# Patient Record
Sex: MALE | Race: WHITE | NOT HISPANIC OR LATINO | Employment: FULL TIME | URBAN - METROPOLITAN AREA
[De-identification: names, ages, dates, MRNs, and addresses within clinical notes are randomized per-mention and may not be internally consistent; named-entity substitution may affect disease eponyms.]

---

## 2022-04-24 ENCOUNTER — APPOINTMENT (OUTPATIENT)
Dept: RADIOLOGY | Facility: MEDICAL CENTER | Age: 67
End: 2022-04-24

## 2022-04-24 ENCOUNTER — APPOINTMENT (OUTPATIENT)
Dept: RADIOLOGY | Facility: MEDICAL CENTER | Age: 67
End: 2022-04-24
Attending: EMERGENCY MEDICINE

## 2022-04-24 ENCOUNTER — HOSPITAL ENCOUNTER (OUTPATIENT)
Facility: MEDICAL CENTER | Age: 67
End: 2022-04-24
Attending: EMERGENCY MEDICINE | Admitting: INTERNAL MEDICINE

## 2022-04-24 VITALS
SYSTOLIC BLOOD PRESSURE: 118 MMHG | OXYGEN SATURATION: 96 % | WEIGHT: 188.71 LBS | TEMPERATURE: 97.7 F | BODY MASS INDEX: 30.33 KG/M2 | HEART RATE: 61 BPM | DIASTOLIC BLOOD PRESSURE: 59 MMHG | RESPIRATION RATE: 17 BRPM | HEIGHT: 66 IN

## 2022-04-24 DIAGNOSIS — F17.200 SMOKING: ICD-10-CM

## 2022-04-24 DIAGNOSIS — R07.89 OTHER CHEST PAIN: ICD-10-CM

## 2022-04-24 DIAGNOSIS — R07.9 CHEST PAIN, UNSPECIFIED TYPE: ICD-10-CM

## 2022-04-24 LAB
ALBUMIN SERPL BCP-MCNC: 4.9 G/DL (ref 3.2–4.9)
ALBUMIN/GLOB SERPL: 1.8 G/DL
ALP SERPL-CCNC: 90 U/L (ref 30–99)
ALT SERPL-CCNC: 28 U/L (ref 2–50)
ANION GAP SERPL CALC-SCNC: 14 MMOL/L (ref 7–16)
APTT PPP: 38.6 SEC (ref 24.7–36)
AST SERPL-CCNC: 22 U/L (ref 12–45)
BASOPHILS # BLD AUTO: 0.6 % (ref 0–1.8)
BASOPHILS # BLD: 0.05 K/UL (ref 0–0.12)
BILIRUB SERPL-MCNC: 0.4 MG/DL (ref 0.1–1.5)
BUN SERPL-MCNC: 13 MG/DL (ref 8–22)
CALCIUM SERPL-MCNC: 9.9 MG/DL (ref 8.5–10.5)
CHLORIDE SERPL-SCNC: 100 MMOL/L (ref 96–112)
CO2 SERPL-SCNC: 24 MMOL/L (ref 20–33)
CREAT SERPL-MCNC: 0.73 MG/DL (ref 0.5–1.4)
D DIMER PPP IA.FEU-MCNC: 0.27 UG/ML (FEU) (ref 0–0.5)
D DIMER PPP IA.FEU-MCNC: <0.27 UG/ML (FEU) (ref 0–0.5)
EKG IMPRESSION: NORMAL
EOSINOPHIL # BLD AUTO: 0.23 K/UL (ref 0–0.51)
EOSINOPHIL NFR BLD: 2.7 % (ref 0–6.9)
ERYTHROCYTE [DISTWIDTH] IN BLOOD BY AUTOMATED COUNT: 42.5 FL (ref 35.9–50)
GFR SERPLBLD CREATININE-BSD FMLA CKD-EPI: 100 ML/MIN/1.73 M 2
GLOBULIN SER CALC-MCNC: 2.8 G/DL (ref 1.9–3.5)
GLUCOSE BLD STRIP.AUTO-MCNC: 123 MG/DL (ref 65–99)
GLUCOSE SERPL-MCNC: 112 MG/DL (ref 65–99)
HCT VFR BLD AUTO: 49.1 % (ref 42–52)
HGB BLD-MCNC: 16.4 G/DL (ref 14–18)
IMM GRANULOCYTES # BLD AUTO: 0.06 K/UL (ref 0–0.11)
IMM GRANULOCYTES NFR BLD AUTO: 0.7 % (ref 0–0.9)
INR PPP: 1.01 (ref 0.87–1.13)
LYMPHOCYTES # BLD AUTO: 1.32 K/UL (ref 1–4.8)
LYMPHOCYTES NFR BLD: 15.3 % (ref 22–41)
MCH RBC QN AUTO: 33.2 PG (ref 27–33)
MCHC RBC AUTO-ENTMCNC: 33.4 G/DL (ref 33.7–35.3)
MCV RBC AUTO: 99.4 FL (ref 81.4–97.8)
MONOCYTES # BLD AUTO: 0.74 K/UL (ref 0–0.85)
MONOCYTES NFR BLD AUTO: 8.6 % (ref 0–13.4)
NEUTROPHILS # BLD AUTO: 6.22 K/UL (ref 1.82–7.42)
NEUTROPHILS NFR BLD: 72.1 % (ref 44–72)
NRBC # BLD AUTO: 0 K/UL
NRBC BLD-RTO: 0 /100 WBC
PLATELET # BLD AUTO: 262 K/UL (ref 164–446)
PMV BLD AUTO: 9.5 FL (ref 9–12.9)
POTASSIUM SERPL-SCNC: 3.9 MMOL/L (ref 3.6–5.5)
PROT SERPL-MCNC: 7.7 G/DL (ref 6–8.2)
PROTHROMBIN TIME: 13 SEC (ref 12–14.6)
RBC # BLD AUTO: 4.94 M/UL (ref 4.7–6.1)
SODIUM SERPL-SCNC: 138 MMOL/L (ref 135–145)
TROPONIN T SERPL-MCNC: 22 NG/L (ref 6–19)
TROPONIN T SERPL-MCNC: 26 NG/L (ref 6–19)
TROPONIN T SERPL-MCNC: 28 NG/L (ref 6–19)
WBC # BLD AUTO: 8.6 K/UL (ref 4.8–10.8)

## 2022-04-24 PROCEDURE — 85025 COMPLETE CBC W/AUTO DIFF WBC: CPT

## 2022-04-24 PROCEDURE — 700102 HCHG RX REV CODE 250 W/ 637 OVERRIDE(OP): Performed by: HOSPITALIST

## 2022-04-24 PROCEDURE — 96374 THER/PROPH/DIAG INJ IV PUSH: CPT

## 2022-04-24 PROCEDURE — A9270 NON-COVERED ITEM OR SERVICE: HCPCS | Performed by: HOSPITALIST

## 2022-04-24 PROCEDURE — A9270 NON-COVERED ITEM OR SERVICE: HCPCS | Performed by: INTERNAL MEDICINE

## 2022-04-24 PROCEDURE — 93005 ELECTROCARDIOGRAM TRACING: CPT | Performed by: INTERNAL MEDICINE

## 2022-04-24 PROCEDURE — 84484 ASSAY OF TROPONIN QUANT: CPT | Mod: 91

## 2022-04-24 PROCEDURE — 85610 PROTHROMBIN TIME: CPT

## 2022-04-24 PROCEDURE — 96376 TX/PRO/DX INJ SAME DRUG ADON: CPT

## 2022-04-24 PROCEDURE — G0378 HOSPITAL OBSERVATION PER HR: HCPCS

## 2022-04-24 PROCEDURE — 93005 ELECTROCARDIOGRAM TRACING: CPT | Performed by: EMERGENCY MEDICINE

## 2022-04-24 PROCEDURE — 71045 X-RAY EXAM CHEST 1 VIEW: CPT

## 2022-04-24 PROCEDURE — 99285 EMERGENCY DEPT VISIT HI MDM: CPT

## 2022-04-24 PROCEDURE — 36415 COLL VENOUS BLD VENIPUNCTURE: CPT

## 2022-04-24 PROCEDURE — 96375 TX/PRO/DX INJ NEW DRUG ADDON: CPT

## 2022-04-24 PROCEDURE — 85379 FIBRIN DEGRADATION QUANT: CPT | Mod: 91

## 2022-04-24 PROCEDURE — 85730 THROMBOPLASTIN TIME PARTIAL: CPT

## 2022-04-24 PROCEDURE — 93005 ELECTROCARDIOGRAM TRACING: CPT

## 2022-04-24 PROCEDURE — 80053 COMPREHEN METABOLIC PANEL: CPT

## 2022-04-24 PROCEDURE — 99218 PR INITIAL OBSERVATION CARE,LEVL I: CPT | Performed by: INTERNAL MEDICINE

## 2022-04-24 PROCEDURE — 82962 GLUCOSE BLOOD TEST: CPT

## 2022-04-24 PROCEDURE — 700111 HCHG RX REV CODE 636 W/ 250 OVERRIDE (IP): Performed by: EMERGENCY MEDICINE

## 2022-04-24 PROCEDURE — 700111 HCHG RX REV CODE 636 W/ 250 OVERRIDE (IP): Performed by: INTERNAL MEDICINE

## 2022-04-24 PROCEDURE — 700102 HCHG RX REV CODE 250 W/ 637 OVERRIDE(OP): Performed by: INTERNAL MEDICINE

## 2022-04-24 RX ORDER — IBUPROFEN 600 MG/1
600 TABLET ORAL EVERY 6 HOURS PRN
Qty: 30 TABLET | Refills: 0 | Status: SHIPPED | OUTPATIENT
Start: 2022-04-24 | End: 2022-04-25

## 2022-04-24 RX ORDER — OXYCODONE HYDROCHLORIDE 5 MG/1
5 TABLET ORAL EVERY 6 HOURS PRN
Status: DISCONTINUED | OUTPATIENT
Start: 2022-04-24 | End: 2022-04-24 | Stop reason: HOSPADM

## 2022-04-24 RX ORDER — AMOXICILLIN 250 MG
2 CAPSULE ORAL 2 TIMES DAILY
Status: DISCONTINUED | OUTPATIENT
Start: 2022-04-24 | End: 2022-04-24 | Stop reason: HOSPADM

## 2022-04-24 RX ORDER — ASPIRIN 325 MG
325 TABLET ORAL DAILY
Status: DISCONTINUED | OUTPATIENT
Start: 2022-04-25 | End: 2022-04-24 | Stop reason: HOSPADM

## 2022-04-24 RX ORDER — ONDANSETRON 4 MG/1
4 TABLET, ORALLY DISINTEGRATING ORAL EVERY 4 HOURS PRN
Status: DISCONTINUED | OUTPATIENT
Start: 2022-04-24 | End: 2022-04-24 | Stop reason: HOSPADM

## 2022-04-24 RX ORDER — BISACODYL 10 MG
10 SUPPOSITORY, RECTAL RECTAL
Status: DISCONTINUED | OUTPATIENT
Start: 2022-04-24 | End: 2022-04-24 | Stop reason: HOSPADM

## 2022-04-24 RX ORDER — EMPAGLIFLOZIN, METFORMIN HYDROCHLORIDE 12.5; 1 MG/1; MG/1
1 TABLET, EXTENDED RELEASE ORAL 2 TIMES DAILY
COMMUNITY

## 2022-04-24 RX ORDER — OMEPRAZOLE 40 MG/1
40 CAPSULE, DELAYED RELEASE ORAL EVERY MORNING
COMMUNITY

## 2022-04-24 RX ORDER — ATENOLOL 25 MG/1
25 TABLET ORAL EVERY MORNING
Status: DISCONTINUED | OUTPATIENT
Start: 2022-04-24 | End: 2022-04-24

## 2022-04-24 RX ORDER — REGADENOSON 0.08 MG/ML
0.4 INJECTION, SOLUTION INTRAVENOUS
Status: DISCONTINUED | OUTPATIENT
Start: 2022-04-24 | End: 2022-04-24 | Stop reason: HOSPADM

## 2022-04-24 RX ORDER — CLOPIDOGREL BISULFATE 75 MG/1
75 TABLET ORAL EVERY MORNING
Status: DISCONTINUED | OUTPATIENT
Start: 2022-04-25 | End: 2022-04-24 | Stop reason: HOSPADM

## 2022-04-24 RX ORDER — SEMAGLUTIDE 1.34 MG/ML
0.5 INJECTION, SOLUTION SUBCUTANEOUS
COMMUNITY

## 2022-04-24 RX ORDER — AMINOPHYLLINE 25 MG/ML
100 INJECTION, SOLUTION INTRAVENOUS
Status: DISCONTINUED | OUTPATIENT
Start: 2022-04-24 | End: 2022-04-24 | Stop reason: HOSPADM

## 2022-04-24 RX ORDER — NITROGLYCERIN 0.4 MG/1
0.4 TABLET SUBLINGUAL
Status: DISCONTINUED | OUTPATIENT
Start: 2022-04-24 | End: 2022-04-24 | Stop reason: HOSPADM

## 2022-04-24 RX ORDER — ASPIRIN 300 MG/1
300 SUPPOSITORY RECTAL DAILY
Status: DISCONTINUED | OUTPATIENT
Start: 2022-04-25 | End: 2022-04-24 | Stop reason: HOSPADM

## 2022-04-24 RX ORDER — ACETAMINOPHEN 325 MG/1
650 TABLET ORAL EVERY 6 HOURS PRN
Status: DISCONTINUED | OUTPATIENT
Start: 2022-04-24 | End: 2022-04-24 | Stop reason: HOSPADM

## 2022-04-24 RX ORDER — ONDANSETRON 2 MG/ML
4 INJECTION INTRAMUSCULAR; INTRAVENOUS EVERY 4 HOURS PRN
Status: DISCONTINUED | OUTPATIENT
Start: 2022-04-24 | End: 2022-04-24 | Stop reason: HOSPADM

## 2022-04-24 RX ORDER — OXYCODONE HYDROCHLORIDE 10 MG/1
10 TABLET ORAL EVERY 4 HOURS PRN
Status: DISCONTINUED | OUTPATIENT
Start: 2022-04-24 | End: 2022-04-24 | Stop reason: HOSPADM

## 2022-04-24 RX ORDER — ONDANSETRON 2 MG/ML
4 INJECTION INTRAMUSCULAR; INTRAVENOUS EVERY 4 HOURS PRN
Status: DISCONTINUED | OUTPATIENT
Start: 2022-04-24 | End: 2022-04-24

## 2022-04-24 RX ORDER — FUROSEMIDE 20 MG/1
20 TABLET ORAL 2 TIMES DAILY
COMMUNITY

## 2022-04-24 RX ORDER — POLYETHYLENE GLYCOL 3350 17 G/17G
1 POWDER, FOR SOLUTION ORAL
Status: DISCONTINUED | OUTPATIENT
Start: 2022-04-24 | End: 2022-04-24 | Stop reason: HOSPADM

## 2022-04-24 RX ORDER — METOPROLOL SUCCINATE 25 MG/1
25 TABLET, EXTENDED RELEASE ORAL EVERY MORNING
COMMUNITY

## 2022-04-24 RX ORDER — ASPIRIN 81 MG/1
324 TABLET, CHEWABLE ORAL DAILY
Status: DISCONTINUED | OUTPATIENT
Start: 2022-04-25 | End: 2022-04-24 | Stop reason: HOSPADM

## 2022-04-24 RX ORDER — MORPHINE SULFATE 4 MG/ML
4 INJECTION INTRAVENOUS ONCE
Status: COMPLETED | OUTPATIENT
Start: 2022-04-24 | End: 2022-04-24

## 2022-04-24 RX ORDER — LISINOPRIL 10 MG/1
10 TABLET ORAL 2 TIMES DAILY
Status: DISCONTINUED | OUTPATIENT
Start: 2022-04-25 | End: 2022-04-24 | Stop reason: HOSPADM

## 2022-04-24 RX ORDER — ISOSORBIDE MONONITRATE 30 MG/1
30 TABLET, EXTENDED RELEASE ORAL EVERY MORNING
Status: DISCONTINUED | OUTPATIENT
Start: 2022-04-24 | End: 2022-04-24

## 2022-04-24 RX ORDER — ONDANSETRON 2 MG/ML
4 INJECTION INTRAMUSCULAR; INTRAVENOUS ONCE
Status: COMPLETED | OUTPATIENT
Start: 2022-04-24 | End: 2022-04-24

## 2022-04-24 RX ORDER — ASPIRIN 325 MG
325 TABLET ORAL EVERY MORNING
Status: DISCONTINUED | OUTPATIENT
Start: 2022-04-25 | End: 2022-04-24

## 2022-04-24 RX ORDER — ATORVASTATIN CALCIUM 80 MG/1
80 TABLET, FILM COATED ORAL DAILY
Status: DISCONTINUED | OUTPATIENT
Start: 2022-04-25 | End: 2022-04-24 | Stop reason: HOSPADM

## 2022-04-24 RX ORDER — VERAPAMIL HYDROCHLORIDE 240 MG/1
240 TABLET, FILM COATED, EXTENDED RELEASE ORAL EVERY MORNING
Status: DISCONTINUED | OUTPATIENT
Start: 2022-04-24 | End: 2022-04-24

## 2022-04-24 RX ADMIN — OXYCODONE 5 MG: 5 TABLET ORAL at 15:12

## 2022-04-24 RX ADMIN — SENNOSIDES AND DOCUSATE SODIUM 2 TABLET: 50; 8.6 TABLET ORAL at 13:02

## 2022-04-24 RX ADMIN — OXYCODONE HYDROCHLORIDE 10 MG: 10 TABLET ORAL at 16:16

## 2022-04-24 RX ADMIN — ONDANSETRON 4 MG: 4 TABLET, ORALLY DISINTEGRATING ORAL at 15:09

## 2022-04-24 RX ADMIN — MORPHINE SULFATE 4 MG: 4 INJECTION INTRAVENOUS at 09:55

## 2022-04-24 RX ADMIN — SENNOSIDES AND DOCUSATE SODIUM 2 TABLET: 50; 8.6 TABLET ORAL at 18:00

## 2022-04-24 RX ADMIN — ONDANSETRON 4 MG: 2 INJECTION INTRAMUSCULAR; INTRAVENOUS at 09:55

## 2022-04-24 RX ADMIN — ONDANSETRON 4 MG: 2 INJECTION INTRAMUSCULAR; INTRAVENOUS at 10:49

## 2022-04-24 ASSESSMENT — LIFESTYLE VARIABLES
CONSUMPTION TOTAL: INCOMPLETE
HOW MANY TIMES IN THE PAST YEAR HAVE YOU HAD 5 OR MORE DRINKS IN A DAY: 0
TOTAL SCORE: 0
HAVE YOU EVER FELT YOU SHOULD CUT DOWN ON YOUR DRINKING: NO
EVER HAD A DRINK FIRST THING IN THE MORNING TO STEADY YOUR NERVES TO GET RID OF A HANGOVER: NO
HAVE YOU EVER FELT YOU SHOULD CUT DOWN ON YOUR DRINKING: NO
ON A TYPICAL DAY WHEN YOU DRINK ALCOHOL HOW MANY DRINKS DO YOU HAVE: 0
EVER FELT BAD OR GUILTY ABOUT YOUR DRINKING: NO
HAVE PEOPLE ANNOYED YOU BY CRITICIZING YOUR DRINKING: NO
ON A TYPICAL DAY WHEN YOU DRINK ALCOHOL HOW MANY DRINKS DO YOU HAVE: 0
AVERAGE NUMBER OF DAYS PER WEEK YOU HAVE A DRINK CONTAINING ALCOHOL: 0
EVER FELT BAD OR GUILTY ABOUT YOUR DRINKING: NO
TOTAL SCORE: 0
DO YOU DRINK ALCOHOL: NO
AVERAGE NUMBER OF DAYS PER WEEK YOU HAVE A DRINK CONTAINING ALCOHOL: 0
ALCOHOL_USE: NO
EVER HAD A DRINK FIRST THING IN THE MORNING TO STEADY YOUR NERVES TO GET RID OF A HANGOVER: NO
HOW MANY TIMES IN THE PAST YEAR HAVE YOU HAD 5 OR MORE DRINKS IN A DAY: 0
TOTAL SCORE: 0
CONSUMPTION TOTAL: NEGATIVE
ALCOHOL_USE: NO

## 2022-04-24 ASSESSMENT — ENCOUNTER SYMPTOMS
EYES NEGATIVE: 1
CONSTITUTIONAL NEGATIVE: 1
MUSCULOSKELETAL NEGATIVE: 1
PALPITATIONS: 0
NEUROLOGICAL NEGATIVE: 1
PSYCHIATRIC NEGATIVE: 1
CLAUDICATION: 0
GASTROINTESTINAL NEGATIVE: 1
ORTHOPNEA: 0
RESPIRATORY NEGATIVE: 1

## 2022-04-24 ASSESSMENT — PAIN DESCRIPTION - PAIN TYPE: TYPE: ACUTE PAIN

## 2022-04-24 ASSESSMENT — FIBROSIS 4 INDEX: FIB4 SCORE: 1.03

## 2022-04-24 ASSESSMENT — PATIENT HEALTH QUESTIONNAIRE - PHQ9
1. LITTLE INTEREST OR PLEASURE IN DOING THINGS: NOT AT ALL
2. FEELING DOWN, DEPRESSED, IRRITABLE, OR HOPELESS: NOT AT ALL
SUM OF ALL RESPONSES TO PHQ9 QUESTIONS 1 AND 2: 0

## 2022-04-24 NOTE — ED TRIAGE NOTES
.Mikael Cardoso  .  Chief Complaint   Patient presents with   • Chest Pain     Onset at 0400, patient c/o mid chest pain radiating to jaw and left arm.      Patient to triage with above complaint. Hx of MI x 3. Patient tool x 3 nitro and ASA pta.     EKG completed.   Patient to lobby and instructed to inform staff of any needs.

## 2022-04-24 NOTE — ED PROVIDER NOTES
ED Provider Note        Primary care provider: Pcp Pt States None    I verified that the patient was wearing a mask and I was wearing appropriate PPE every time I entered the room. The patient's mask was on the patient at all times during my encounter except for a brief view of the oropharynx.      CHIEF COMPLAINT  Chief Complaint   Patient presents with   • Chest Pain     Onset at 0400, patient c/o mid chest pain radiating to jaw and left arm.        HPI  Mkiael Cardoso is a 66 y.o. male who presents to the Emergency Department with chief complaint of chest pain.  Patient reports that approximately 4 AM while at work today began having some heavy sensation pain in his chest with radiation to his left arm and left side of his jaw.  Minor nauseousness at that time no shortness of breath no diaphoresis.  Patient has previous history of coronary artery disease and reports that he has 3 previous stents in his LAD.  He has not seen any cardiologist in approximately 5 years.  Was recently diagnosed with diabetes by his primary care physician and was restarted on all of his medications.  He does continue to smoke      REVIEW OF SYSTEMS  10 systems reviewed and otherwise negative, pertinent positives and negatives listed in the history of present illness.    PAST MEDICAL HISTORY   has a past medical history of CAD (coronary artery disease), CAD (coronary artery disease) (8/24/2013), CHEST PAIN (8/24/2013), HLD (hyperlipidemia) (8/24/2013), HTN (hypertension) (8/24/2013), Hypercholesteremia, Hypertension, MI (myocardial infarction) (HCC), and Occluded coronary artery stent.    SURGICAL HISTORY   has a past surgical history that includes angioplasty; other cardiac surgery; and other cardiac surgery (2008).    SOCIAL HISTORY  Social History     Tobacco Use   • Smoking status: Current Every Day Smoker     Packs/day: 0.50     Types: Cigarettes   • Smokeless tobacco: Never Used   Vaping Use   • Vaping Use: Never used   Substance  "Use Topics   • Alcohol use: Not Currently     Comment: 2-3 week   • Drug use: No      Social History     Substance and Sexual Activity   Drug Use No         FAMILY HISTORY  Non-Contributory      CURRENT MEDICATIONS  Home Medications     Reviewed by Leah Law R.N. (Registered Nurse) on 04/24/22 at 0850  Med List Status: Partial   Medication Last Dose Status   aspirin (ASA) 325 MG TABS  Active   atenolol (TENORMIN) 25 MG TABS not taking Active   atorvastatin (LIPITOR) 40 MG TABS  Active   clopidogrel (PLAVIX) 75 MG TABS  Active   Iron-Vitamins (GERITOL PO)  Active   isosorbide mononitrate SR (IMDUR) 30 MG TB24 not taking Active   lisinopril (PRINIVIL) 10 MG TABS  Active   Multiple Vitamins-Minerals (CENTRUM SILVER PO)  Active   nitroglycerin (NITROSTAT) 0.4 MG SUBL  Active   verapamil ER (CALAN-SR) 240 MG TBCR  Active                ALLERGIES  Allergies   Allergen Reactions   • Ender-1 [Lidocaine Hcl] Anaphylaxis     \"All irvin.\"     • Lidocaine Hcl Anaphylaxis     \"All irvin.\"   • Other Drug Anaphylaxis     \"All irvin.\"   • Ketorolac Tromethamine Hives   • Metoclopramide Anxiety     Claustrophobia.       PHYSICAL EXAM  VITAL SIGNS: /83   Pulse 74   Temp 35.8 °C (96.5 °F) (Temporal)   Resp (!) 21   Ht 1.676 m (5' 6\")   Wt 85.7 kg (188 lb 15 oz)   SpO2 92%   BMI 30.49 kg/m²   Pulse ox interpretation: I interpret this pulse ox as normal.  Constitutional: Alert and oriented x 3, minimal distress  HEENT: Atraumatic normocephalic, pupils are equal round, extraocular movements are intact. The nares is clear, external ears are normal, mouth shows moist mucous membranes  Neck: no obvious JVD or tracheal deviation  Cardiovascular: Regular rate and rhythm no murmur rub or gallop   Thorax & Lungs: No respiratory distress, no wheezes rales or rhonchi, No chest tenderness.   GI: Soft nontender nondistended positive bowel sounds, no peritoneal signs  Skin: Warm dry no obvious acute rash or " lesion  Musculoskeletal: Moving all extremities with normal range strength, no acute  deformity  Neurologic: Cranial nerves III through XII are grossly intact, no sensory deficit, no cerebellar dysfunction   Psychiatric: Appropriate affect for situation at this time      DIAGNOSTIC STUDIES / PROCEDURES  LABS      Results for orders placed or performed during the hospital encounter of 04/24/22   CBC with Differential   Result Value Ref Range    WBC 8.6 4.8 - 10.8 K/uL    RBC 4.94 4.70 - 6.10 M/uL    Hemoglobin 16.4 14.0 - 18.0 g/dL    Hematocrit 49.1 42.0 - 52.0 %    MCV 99.4 (H) 81.4 - 97.8 fL    MCH 33.2 (H) 27.0 - 33.0 pg    MCHC 33.4 (L) 33.7 - 35.3 g/dL    RDW 42.5 35.9 - 50.0 fL    Platelet Count 262 164 - 446 K/uL    MPV 9.5 9.0 - 12.9 fL    Neutrophils-Polys 72.10 (H) 44.00 - 72.00 %    Lymphocytes 15.30 (L) 22.00 - 41.00 %    Monocytes 8.60 0.00 - 13.40 %    Eosinophils 2.70 0.00 - 6.90 %    Basophils 0.60 0.00 - 1.80 %    Immature Granulocytes 0.70 0.00 - 0.90 %    Nucleated RBC 0.00 /100 WBC    Neutrophils (Absolute) 6.22 1.82 - 7.42 K/uL    Lymphs (Absolute) 1.32 1.00 - 4.80 K/uL    Monos (Absolute) 0.74 0.00 - 0.85 K/uL    Eos (Absolute) 0.23 0.00 - 0.51 K/uL    Baso (Absolute) 0.05 0.00 - 0.12 K/uL    Immature Granulocytes (abs) 0.06 0.00 - 0.11 K/uL    NRBC (Absolute) 0.00 K/uL   Complete Metabolic Panel (CMP)   Result Value Ref Range    Sodium 138 135 - 145 mmol/L    Potassium 3.9 3.6 - 5.5 mmol/L    Chloride 100 96 - 112 mmol/L    Co2 24 20 - 33 mmol/L    Anion Gap 14.0 7.0 - 16.0    Glucose 112 (H) 65 - 99 mg/dL    Bun 13 8 - 22 mg/dL    Creatinine 0.73 0.50 - 1.40 mg/dL    Calcium 9.9 8.5 - 10.5 mg/dL    AST(SGOT) 22 12 - 45 U/L    ALT(SGPT) 28 2 - 50 U/L    Alkaline Phosphatase 90 30 - 99 U/L    Total Bilirubin 0.4 0.1 - 1.5 mg/dL    Albumin 4.9 3.2 - 4.9 g/dL    Total Protein 7.7 6.0 - 8.2 g/dL    Globulin 2.8 1.9 - 3.5 g/dL    A-G Ratio 1.8 g/dL   Troponin   Result Value Ref Range    Troponin T  26 (H) 6 - 19 ng/L   PTT   Result Value Ref Range    APTT 38.6 (H) 24.7 - 36.0 sec   PT/INR   Result Value Ref Range    PT 13.0 12.0 - 14.6 sec    INR 1.01 0.87 - 1.13   ESTIMATED GFR   Result Value Ref Range    GFR (CKD-EPI) 100 >60 mL/min/1.73 m 2   Troponin - STAT Once   Result Value Ref Range    Troponin T 22 (H) 6 - 19 ng/L   Troponin in four (4) hours   Result Value Ref Range    Troponin T 28 (H) 6 - 19 ng/L   D-Dimer   Result Value Ref Range    D-Dimer Screen 0.27 0.00 - 0.50 ug/mL (FEU)   EKG (NOW)   Result Value Ref Range    Report       [facility] Emergency Dept.    Test Date:  2022  Pt Name:    JUAN HARVEY                   Department: ER  MRN:        [id number]                      Room:  Gender:     Male                         Technician: [id number]  :        [id number]                   Requested By:ER TRIAGE PROTOCOL  Order #:    [id number]                    Reading MD: EVA GOTTLIEB MD    Measurements  Intervals                                Axis  Rate:       66                           P:          3  AL:         141                          QRS:        -28  QRSD:       103                          T:          37  QT:         389  QTc:        409    Interpretive Statements  Sinus rhythm  Borderline left axis deviation  Compared to ECG 2015 10:31:53  No significant changes  Electronically Signed On 2022 9:06:19 PDT by EVA GOTTLIEB MD     EKG in four (4) hours   Result Value Ref Range    Report       [facility]    Test Date:  2022  Pt Name:    JUAN HARVEY                   Department: ER  MRN:        [id number]                      Room:       T212  Gender:     Male                         Technician: SRS  :        1955                   Requested By:ABHINAV CORTEZ  Order #:    [id number]                    Reading MD:    Measurements  Intervals                                Axis  Rate:       60                           P:           "62  GA:         169                          QRS:        -31  QRSD:       106                          T:          13  QT:         405  QTc:        405    Interpretive Statements  SINUS RHYTHM  LEFT AXIS DEVIATION  Compared to ECG 04/24/2022 08:42:18  No significant changes         All labs reviewed by me.      RADIOLOGY  DX-CHEST-PORTABLE (1 VIEW)   Final Result         1. No acute cardiopulmonary abnormalities are identified.      NM-CARDIAC STRESS TEST    (Results Pending)         COURSE & MEDICAL DECISION MAKING  Pertinent Labs & Imaging studies reviewed. (See chart for details)    9:04 AM - Patient seen and examined at bedside.     Coagulation studies were ordered in light of chest pain and possible need for anticoagulation    Patient noted to have slightly elevated blood pressure likely circumstantial secondary to presenting complaint. Referred to primary care physician for further evaluation.      Medical Decision Making: Patient with mid chest pain despite aspirin and nitroglycerin prior to arrival he is also a smoker diabetic has not had provocative testing in several years and has a history of coronary artery disease.  EKG does not show any acute changes at this time however he does pose high risk he was discussed with the hospitalist and admitted to the clinical decision unit for ongoing evaluation and treatment.    /83   Pulse 74   Temp 35.8 °C (96.5 °F) (Temporal)   Resp (!) 21   Ht 1.676 m (5' 6\")   Wt 85.7 kg (188 lb 15 oz)   SpO2 92%   BMI 30.49 kg/m²     No follow-up provider specified.    Current Discharge Medication List          FINAL IMPRESSION  1. Chest pain, unspecified type           This dictation has been created using voice recognition software and/or scribes. The accuracy of the dictation is limited by the abilities of the software and the expertise of the scribes. I expect there may be some errors of grammar and possibly content. I made every attempt to manually correct the " errors within my dictation. However, errors related to voice recognition software and/or scribes may still exist and should be interpreted within the appropriate context.

## 2022-04-24 NOTE — ASSESSMENT & PLAN NOTE
Patient states he had stent in LAD in 2008  Came with this chest pain  No acute coronary symptoms at this time to continue aspirin and atorvastatin  Stress test tomorrow

## 2022-04-24 NOTE — H&P
Hospital Medicine History & Physical Note    Date of Service  4/24/2022    Primary Care Physician  Sarkis Russell M.D.    Consultants  None     Code Status  Full Code    Chief Complaint  Chief Complaint   Patient presents with   • Chest Pain     Onset at 0400, patient c/o mid chest pain radiating to jaw and left arm.        History of Presenting Illness    66-year-old male with history of coronary artery disease, recurrent chest pain hypertension dyslipidemia obesity and diabetes presented 2/24 with this chest pain, patient was working today when he started having pressure chest pain, radiated to his left shoulder associated with some nausea and shortness of breath and later started having some dizziness, he tried nitroglycerin with no significant improving, on admission EKG did not show any ischemic changes and chest x-ray no acute finding, troponin was 26, patient has history of cardiac cath in 2006 and was normal, according to the patient patient had a stent on his heart 2008, patient was admitted to [place] for chest pain in December 2021 at [facility] and stress test was negative at that time, patient will be admitted for close monitoring and chest pain rule out.    I discussed the plan of care with patient, bedside RN and ED physician.    Review of Systems  Review of Systems   Constitutional: Negative.    HENT: Negative.    Eyes: Negative.    Respiratory: Negative.    Cardiovascular: Positive for chest pain. Negative for palpitations, orthopnea and claudication.   Gastrointestinal: Negative.    Genitourinary: Negative.    Musculoskeletal: Negative.    Skin: Negative.    Neurological: Negative.    Psychiatric/Behavioral: Negative.        Past Medical History   has a past medical history of CAD (coronary artery disease), CAD (coronary artery disease) (8/24/2013), CHEST PAIN (8/24/2013), HLD (hyperlipidemia) (8/24/2013), HTN (hypertension) (8/24/2013), Hypercholesteremia, Hypertension, MI (myocardial  "infarction) (HCC), and Occluded coronary artery stent.    Surgical History   has a past surgical history that includes angioplasty; other cardiac surgery; and other cardiac surgery (2008).     Family History   Heart disease with mother and father   Family history reviewed with patient. There is no family history that is pertinent to the chief complaint.     Social History   reports that he has been smoking cigarettes. He has been smoking about 0.50 packs per day. He has never used smokeless tobacco. He reports previous alcohol use. He reports that he does not use drugs.    Allergies  Allergies   Allergen Reactions   • Ender-1 [Lidocaine Hcl] Anaphylaxis     \"All irvin.\"     • Lidocaine Hcl Anaphylaxis     \"All irvin.\"   • Other Drug Anaphylaxis     \"All irvin.\"   • Ketorolac Tromethamine Hives   • Metoclopramide Anxiety     Claustrophobia.       Medications  Prior to Admission Medications   Prescriptions Last Dose Informant Patient Reported? Taking?   Iron-Vitamins (GERITOL PO)  Patient Yes No   Sig: Take 1 Tab by mouth every day. Indications: **OTC**   Multiple Vitamins-Minerals (CENTRUM SILVER PO)  Patient Yes No   Sig: Take 1 Tab by mouth every day. Indications: **OTC**   aspirin (ASA) 325 MG TABS  Patient Yes No   Sig: Take 325 mg by mouth every morning.   atenolol (TENORMIN) 25 MG TABS not taking Patient Yes No   Sig: Take 25 mg by mouth every morning.   atorvastatin (LIPITOR) 40 MG TABS  Patient Yes No   Sig: Take 40 mg by mouth every day.   clopidogrel (PLAVIX) 75 MG TABS  Patient Yes No   Sig: Take 75 mg by mouth every morning.   isosorbide mononitrate SR (IMDUR) 30 MG TB24 not taking Patient Yes No   Sig: Take 30 mg by mouth every morning.   lisinopril (PRINIVIL) 10 MG TABS  Patient Yes No   Sig: Take 10 mg by mouth 2 Times a Day.   nitroglycerin (NITROSTAT) 0.4 MG SUBL  Patient Yes No   Sig: Place 0.4 mg under tongue every 5 minutes as needed. Indications: Chest pain   verapamil ER (CALAN-SR) 240 MG " TBCR  Patient Yes No   Sig: Take 240 mg by mouth every morning.      Facility-Administered Medications: None       Physical Exam  Temp:  [35.8 °C (96.5 °F)-36.2 °C (97.2 °F)] 36.2 °C (97.2 °F)  Pulse:  [50-74] 60  Resp:  [18-21] 18  BP: (115-197)/(77-94) 144/91  SpO2:  [90 %-94 %] 94 %  Blood Pressure : (!) 168/94   Temperature: 35.8 °C (96.5 °F)   Pulse: 62   Respiration: 20   Pulse Oximetry: 91 %       Physical Exam  Constitutional:       Appearance: He is not ill-appearing.   HENT:      Head: Normocephalic and atraumatic.   Cardiovascular:      Rate and Rhythm: Normal rate.      Heart sounds: No murmur heard.  Pulmonary:      Effort: No respiratory distress.      Breath sounds: Normal breath sounds. No wheezing.   Abdominal:      General: There is no distension.      Palpations: Abdomen is soft.      Tenderness: There is no abdominal tenderness. There is no guarding.   Neurological:      General: No focal deficit present.      Mental Status: He is alert. Mental status is at baseline.      Cranial Nerves: No cranial nerve deficit.      Motor: No weakness.         Laboratory:  Recent Labs     04/24/22  0923   WBC 8.6   RBC 4.94   HEMOGLOBIN 16.4   HEMATOCRIT 49.1   MCV 99.4*   MCH 33.2*   MCHC 33.4*   RDW 42.5   PLATELETCT 262   MPV 9.5     Recent Labs     04/24/22  0923   SODIUM 138   POTASSIUM 3.9   CHLORIDE 100   CO2 24   GLUCOSE 112*   BUN 13   CREATININE 0.73   CALCIUM 9.9     Recent Labs     04/24/22  0923   ALTSGPT 28   ASTSGOT 22   ALKPHOSPHAT 90   TBILIRUBIN 0.4   GLUCOSE 112*     Recent Labs     04/24/22  0923   APTT 38.6*   INR 1.01     No results for input(s): NTPROBNP in the last 72 hours.      Recent Labs     04/24/22  0923 04/24/22  1125   TROPONINT 26* 22*       Imaging:  DX-CHEST-PORTABLE (1 VIEW)   Final Result         1. No acute cardiopulmonary abnormalities are identified.      NM-CARDIAC STRESS TEST    (Results Pending)       X-Ray:  I have personally reviewed the images and compared with  prior images.    Assessment/Plan:  I anticipate this patient is appropriate for observation status at this time.    * Chest pain- (present on admission)  Assessment & Plan  Typical chest pain with the pressure pain  Patient has recurrent admission to ED for chest pain  Stress test in 2014 and 2021 were negative  Patient states he has stent in 2008  Due to age and risk factors for coronary artery disease and  smoking with typical chest pain, patient will be admitted to telemetry, trending troponin and stress test tomorrow  Aspirin and atorvastatin  Check A1c and lipid panel    CAD (coronary artery disease)  Assessment & Plan  Patient states he had stent in LAD in 2008  Came with this chest pain  No acute coronary symptoms at this time to continue aspirin and atorvastatin  Stress test tomorrow    Smoking  Assessment & Plan  History of smoking, encouraged the patient to quit    HLD (hyperlipidemia)- (present on admission)  Assessment & Plan  Continue atorvastatin  Labs tomorrow    HTN (hypertension)- (present on admission)  Assessment & Plan  Continue lisinopril  Close monitoring and adjust medication if needed      VTE prophylaxis: SCDs/TEDs and enoxaparin ppx

## 2022-04-24 NOTE — ASSESSMENT & PLAN NOTE
Typical chest pain with the pressure pain  Patient has recurrent admission to ED for chest pain  Stress test in 2014 and 2021 were negative  Patient states he has stent in 2008  Due to age and risk factors for coronary artery disease and  smoking with typical chest pain, patient will be admitted to telemetry, trending troponin and stress test tomorrow  Aspirin and atorvastatin  Check A1c and lipid panel

## 2022-04-24 NOTE — CARE PLAN
The patient is Stable - Low risk of patient condition declining or worsening    Shift Goals  Clinical Goals: pain control; stress test tomorrow  Patient Goals: pain and nausea control  Family Goals: n/a    Progress made toward(s) clinical / shift goals:      Problem: Knowledge Deficit - Standard  Goal: Patient and family/care givers will demonstrate understanding of plan of care, disease process/condition, diagnostic tests and medications  Outcome: Progressing     Problem: Pain - Standard  Goal: Alleviation of pain or a reduction in pain to the patient’s comfort goal  Outcome: Progressing       Patient is not progressing towards the following goals:

## 2022-04-25 LAB — EKG IMPRESSION: NORMAL

## 2022-04-25 PROCEDURE — 93010 ELECTROCARDIOGRAM REPORT: CPT | Performed by: INTERNAL MEDICINE

## 2022-04-25 NOTE — PROGRESS NOTES
Dr. Giron into see patient to discuss d/c.     IV Dc 'd. Discharge instructions provided to patient. Pt did not verbalize understanding and remained upset throughout d/c process. Pt states all questions were not answered by anyone. Copy of DC provided to patient. Patient refused to sign copy. 1 prescription sent to his pharmacy. Pt states all personal belongings are in possession. Pt escorted off unit by 2 friends/family members without incident. Refused w/c.

## 2022-04-25 NOTE — DISCHARGE SUMMARY
Discharge Summary    CHIEF COMPLAINT ON ADMISSION  Chief Complaint   Patient presents with   • Chest Pain     Onset at 0400, patient c/o mid chest pain radiating to jaw and left arm.        Reason for Admission  Noncardiac chest pain     Admission Date  4/24/2022    CODE STATUS  Full code    HPI & HOSPITAL COURSE    66-year-old male with history of coronary artery disease, recurrent chest pain hypertension dyslipidemia obesity and diabetes presented 2/24 with this chest pain, patient was working today when he started having pressure chest pain, radiated to his left shoulder associated with some nausea and shortness of breath and later started having some dizziness, he tried nitroglycerin with no significant improving, on admission EKG did not show any ischemic changes and chest x-ray no acute finding, D-dimer was negative, troponin was 26, patient has history of cardiac cath in 2006 and was normal, according to the patient patient had a stent on his heart 2008, patient was admitted to [place] for chest pain in December 2021 at [facility] and stress test was negative at that time, patient has many admission to the hospital for chest pain and many negative stress test, the case was discussed with the cardiologist Dr. Lind who does not feel has a chest pain related to heart and did not recommend any stress test or intervention, to continue his medications and follow-up with cardiology clinic.    Therefore, he is discharged in good and stable condition to home with close outpatient follow-up.    The patient recovered much more quickly than anticipated on admission.    Discharge Date  4/24/2022    FOLLOW UP ITEMS POST DISCHARGE  04/25/22      DISCHARGE DIAGNOSES  Principal Problem (Resolved):    Chest pain POA: Yes      Overview: Normal coronary angiogram 2006 [facility]      Multiple readmits for cp. States he has LAD stents      Normal MPI & echo 2014, various hospitals & various states  Active Problems:     "CAD (coronary artery disease) POA: Unknown    HTN (hypertension) POA: Yes    HLD (hyperlipidemia) POA: Yes    Smoking POA: Unknown      FOLLOW UP  Sarkis Russell M.D.  [address]  [place] [id number] [phone]    Schedule an appointment as soon as possible for a visit        MEDICATIONS ON DISCHARGE     Medication List      CONTINUE taking these medications      Instructions   aspirin 325 MG Tabs  Commonly known as: ASA   Take 325 mg by mouth every morning.  Dose: 325 mg     atorvastatin 10 MG Tabs  Commonly known as: LIPITOR   Take 10 mg by mouth every morning.  Dose: 10 mg     clopidogrel 75 MG Tabs  Commonly known as: PLAVIX   Take 75 mg by mouth every morning.  Dose: 75 mg     furosemide 20 MG Tabs  Commonly known as: LASIX   Take 20 mg by mouth every morning.  Dose: 20 mg     lisinopril 20 MG Tabs  Commonly known as: PRINIVIL   Take 20 mg by mouth every morning.  Dose: 20 mg     metoprolol SR 25 MG Tb24  Commonly known as: TOPROL XL   Take 25 mg by mouth every morning.  Dose: 25 mg     nitroglycerin 0.4 MG Subl  Commonly known as: NITROSTAT   Place 0.4 mg under tongue every 5 minutes as needed. Indications: Chest pain  Dose: 0.4 mg     Non Formulary Request   Take 1 Packet by mouth every morning. ** DAILY DIABETES HEALTH PACK **  Dose: 1 Packet     omeprazole 40 MG delayed-release capsule  Commonly known as: PRILOSEC   Take 40 mg by mouth every morning.  Dose: 40 mg     Ozempic (0.25 or 0.5 MG/DOSE) 2 MG/1.5ML Sopn  Generic drug: Semaglutide(0.25 or 0.5MG/DOS)   Inject 0.25 mg under the skin every thursday.  Dose: 0.25 mg     Synjardy XR 12.5-1000 MG Tb24  Generic drug: Empagliflozin-metFORMIN HCl ER   Take 1 Tablet by mouth 2 times a day.  Dose: 1 Tablet            Allergies  Allergies   Allergen Reactions   • Ender-1 [Lidocaine Hcl] Anaphylaxis     \"All irvin.\"     • Lidocaine Hcl Anaphylaxis     \"All irvin.\"   • Other Drug Anaphylaxis     \"All irvin.\"   • Ketorolac Tromethamine Hives   • " Metoclopramide Anxiety     Claustrophobia.       DIET  No orders of the defined types were placed in this encounter.      ACTIVITY  As tolerated.  Weight bearing as tolerated    CONSULTATIONS  Cardio    PROCEDURES  None     LABORATORY  Lab Results   Component Value Date    SODIUM 138 04/24/2022    POTASSIUM 3.9 04/24/2022    CHLORIDE 100 04/24/2022    CO2 24 04/24/2022    GLUCOSE 112 (H) 04/24/2022    BUN 13 04/24/2022    CREATININE 0.73 04/24/2022    CREATININE 0.9 12/06/2007        Lab Results   Component Value Date    WBC 8.6 04/24/2022    HEMOGLOBIN 16.4 04/24/2022    HEMATOCRIT 49.1 04/24/2022    PLATELETCT 262 04/24/2022        Total time of the discharge process exceeds 34 minutes.

## 2022-04-25 NOTE — DISCHARGE INSTRUCTIONS
Chest Pain, Nonspecific  It is often hard to give a specific diagnosis for the cause of chest pain. There is always a chance that your pain could be related to something serious, like a heart attack or a blood clot in the lungs. You need to follow up with your caregiver for further evaluation. More lab tests or other studies such as X-rays, electrocardiography, stress testing, or cardiac imaging may be needed to find the cause of your pain.  Most of the time, nonspecific chest pain improves within 2 to 3 days with rest and mild pain medicine. For the next few days, avoid physical exertion or activities that bring on pain. Do not smoke. Avoid drinking alcohol. Call your caregiver for routine follow-up as advised.   SEEK IMMEDIATE MEDICAL CARE IF:  · You develop increased chest pain or pain that radiates to the arm, neck, jaw, back, or abdomen.   · You develop shortness of breath, increased coughing, or you start coughing up blood.   · You have severe back or abdominal pain, nausea, or vomiting.   · You develop severe weakness, fainting, fever, or chills.   Document Released: 12/18/2006 Document Revised: 03/11/2013 Document Reviewed: 06/06/2008  [facility] ????® Patient Information ©2013 [employer].          Discharge Instructions    Discharged to home by car with self. Discharged via wheelchair, hospital escort: Yes.  Special equipment needed: Not Applicable    Be sure to schedule a follow-up appointment with your primary care doctor or any specialists as instructed.     Discharge Plan:   Diet Plan: Discussed  Activity Level: Discussed  Confirmed Follow up Appointment: Patient to Call and Schedule Appointment  Confirmed Symptoms Management: Discussed  Medication Reconciliation Updated: Yes    I understand that a diet low in cholesterol, fat, and sodium is recommended for good health. Unless I have been given specific instructions below for another diet, I accept this instruction as my diet prescription.   Other diet:  heart healthy     Special Instructions: None    · Is patient discharged on Warfarin / Coumadin?   No     Depression / Suicide Risk    As you are discharged from this [facility] facility, it is important to learn how to keep safe from harming yourself.    Recognize the warning signs:  · Abrupt changes in personality, positive or negative- including increase in energy   · Giving away possessions  · Change in eating patterns- significant weight changes-  positive or negative  · Change in sleeping patterns- unable to sleep or sleeping all the time   · Unwillingness or inability to communicate  · Depression  · Unusual sadness, discouragement and loneliness  · Talk of wanting to die  · Neglect of personal appearance   · Rebelliousness- reckless behavior  · Withdrawal from people/activities they love  · Confusion- inability to concentrate     If you or a loved one observes any of these behaviors or has concerns about self-harm, here's what you can do:  · Talk about it- your feelings and reasons for harming yourself  · Remove any means that you might use to hurt yourself (examples: pills, rope, extension cords, firearm)  · Get professional help from the community (Mental Health, Substance Abuse, psychological counseling)  · Do not be alone:Call your Safe Contact- someone whom you trust who will be there for you.  · Call your local CRISIS HOTLINE 546-8647 or 182-572-8982  · Call your local Children's Mobile Crisis Response Team [place] (024) 398-6074 or www.[employer]isc[employer]  · Call the toll free National Suicide Prevention Hotlines   · National Suicide Prevention Lifeline 027-578-KPRP (5992)  · National Hope Line Network 800-SUICIDE (979-7816)

## 2023-01-02 ENCOUNTER — HOSPITAL ENCOUNTER (OUTPATIENT)
Facility: MEDICAL CENTER | Age: 68
End: 2023-01-02
Attending: EMERGENCY MEDICINE | Admitting: HOSPITALIST
Payer: MEDICARE

## 2023-01-02 ENCOUNTER — APPOINTMENT (OUTPATIENT)
Dept: RADIOLOGY | Facility: MEDICAL CENTER | Age: 68
End: 2023-01-02
Attending: HOSPITALIST
Payer: MEDICARE

## 2023-01-02 ENCOUNTER — APPOINTMENT (OUTPATIENT)
Dept: RADIOLOGY | Facility: MEDICAL CENTER | Age: 68
End: 2023-01-02
Attending: EMERGENCY MEDICINE
Payer: MEDICARE

## 2023-01-02 VITALS
RESPIRATION RATE: 18 BRPM | OXYGEN SATURATION: 95 % | HEART RATE: 66 BPM | WEIGHT: 190 LBS | BODY MASS INDEX: 30.53 KG/M2 | DIASTOLIC BLOOD PRESSURE: 68 MMHG | HEIGHT: 66 IN | SYSTOLIC BLOOD PRESSURE: 147 MMHG | TEMPERATURE: 97.6 F

## 2023-01-02 DIAGNOSIS — R07.89 OTHER CHEST PAIN: ICD-10-CM

## 2023-01-02 DIAGNOSIS — R07.9 CHEST PAIN, UNSPECIFIED TYPE: ICD-10-CM

## 2023-01-02 PROBLEM — E11.65 TYPE 2 DIABETES MELLITUS WITH HYPERGLYCEMIA, WITHOUT LONG-TERM CURRENT USE OF INSULIN (HCC): Status: ACTIVE | Noted: 2023-01-02

## 2023-01-02 LAB
ALBUMIN SERPL BCP-MCNC: 3.9 G/DL (ref 3.2–4.9)
ALBUMIN/GLOB SERPL: 1.4 G/DL
ALP SERPL-CCNC: 92 U/L (ref 30–99)
ALT SERPL-CCNC: 18 U/L (ref 2–50)
ANION GAP SERPL CALC-SCNC: 14 MMOL/L (ref 7–16)
AST SERPL-CCNC: 18 U/L (ref 12–45)
BASOPHILS # BLD AUTO: 0.5 % (ref 0–1.8)
BASOPHILS # BLD: 0.03 K/UL (ref 0–0.12)
BILIRUB SERPL-MCNC: 0.5 MG/DL (ref 0.1–1.5)
BLOOD CULTURE HOLD CXBCH: NORMAL
BUN SERPL-MCNC: 11 MG/DL (ref 8–22)
CALCIUM ALBUM COR SERPL-MCNC: 9.1 MG/DL (ref 8.5–10.5)
CALCIUM SERPL-MCNC: 9 MG/DL (ref 8.5–10.5)
CHLORIDE SERPL-SCNC: 104 MMOL/L (ref 96–112)
CO2 SERPL-SCNC: 18 MMOL/L (ref 20–33)
CREAT SERPL-MCNC: 0.62 MG/DL (ref 0.5–1.4)
EKG IMPRESSION: NORMAL
EOSINOPHIL # BLD AUTO: 0.34 K/UL (ref 0–0.51)
EOSINOPHIL NFR BLD: 6 % (ref 0–6.9)
ERYTHROCYTE [DISTWIDTH] IN BLOOD BY AUTOMATED COUNT: 42.8 FL (ref 35.9–50)
GFR SERPLBLD CREATININE-BSD FMLA CKD-EPI: 105 ML/MIN/1.73 M 2
GLOBULIN SER CALC-MCNC: 2.8 G/DL (ref 1.9–3.5)
GLUCOSE SERPL-MCNC: 144 MG/DL (ref 65–99)
HCT VFR BLD AUTO: 42 % (ref 42–52)
HGB BLD-MCNC: 13.4 G/DL (ref 14–18)
IMM GRANULOCYTES # BLD AUTO: 0.02 K/UL (ref 0–0.11)
IMM GRANULOCYTES NFR BLD AUTO: 0.4 % (ref 0–0.9)
LYMPHOCYTES # BLD AUTO: 1.23 K/UL (ref 1–4.8)
LYMPHOCYTES NFR BLD: 21.7 % (ref 22–41)
MCH RBC QN AUTO: 31.1 PG (ref 27–33)
MCHC RBC AUTO-ENTMCNC: 31.9 G/DL (ref 33.7–35.3)
MCV RBC AUTO: 97.4 FL (ref 81.4–97.8)
MONOCYTES # BLD AUTO: 0.45 K/UL (ref 0–0.85)
MONOCYTES NFR BLD AUTO: 7.9 % (ref 0–13.4)
NEUTROPHILS # BLD AUTO: 3.61 K/UL (ref 1.82–7.42)
NEUTROPHILS NFR BLD: 63.5 % (ref 44–72)
NRBC # BLD AUTO: 0 K/UL
NRBC BLD-RTO: 0 /100 WBC
PLATELET # BLD AUTO: 264 K/UL (ref 164–446)
PMV BLD AUTO: 9.3 FL (ref 9–12.9)
POTASSIUM SERPL-SCNC: 4.1 MMOL/L (ref 3.6–5.5)
PROT SERPL-MCNC: 6.7 G/DL (ref 6–8.2)
RBC # BLD AUTO: 4.31 M/UL (ref 4.7–6.1)
SODIUM SERPL-SCNC: 136 MMOL/L (ref 135–145)
TROPONIN T SERPL-MCNC: 18 NG/L (ref 6–19)
TROPONIN T SERPL-MCNC: 19 NG/L (ref 6–19)
WBC # BLD AUTO: 5.7 K/UL (ref 4.8–10.8)

## 2023-01-02 PROCEDURE — 36415 COLL VENOUS BLD VENIPUNCTURE: CPT

## 2023-01-02 PROCEDURE — 84484 ASSAY OF TROPONIN QUANT: CPT

## 2023-01-02 PROCEDURE — 85025 COMPLETE CBC W/AUTO DIFF WBC: CPT

## 2023-01-02 PROCEDURE — 99204 OFFICE O/P NEW MOD 45 MIN: CPT | Performed by: INTERNAL MEDICINE

## 2023-01-02 PROCEDURE — G0378 HOSPITAL OBSERVATION PER HR: HCPCS

## 2023-01-02 PROCEDURE — 99285 EMERGENCY DEPT VISIT HI MDM: CPT

## 2023-01-02 PROCEDURE — 700102 HCHG RX REV CODE 250 W/ 637 OVERRIDE(OP): Performed by: EMERGENCY MEDICINE

## 2023-01-02 PROCEDURE — 71045 X-RAY EXAM CHEST 1 VIEW: CPT

## 2023-01-02 PROCEDURE — A9270 NON-COVERED ITEM OR SERVICE: HCPCS | Performed by: EMERGENCY MEDICINE

## 2023-01-02 PROCEDURE — A9270 NON-COVERED ITEM OR SERVICE: HCPCS | Performed by: HOSPITALIST

## 2023-01-02 PROCEDURE — 700102 HCHG RX REV CODE 250 W/ 637 OVERRIDE(OP): Performed by: HOSPITALIST

## 2023-01-02 PROCEDURE — 93005 ELECTROCARDIOGRAM TRACING: CPT | Performed by: EMERGENCY MEDICINE

## 2023-01-02 PROCEDURE — 99223 1ST HOSP IP/OBS HIGH 75: CPT | Mod: AI | Performed by: HOSPITALIST

## 2023-01-02 PROCEDURE — 93005 ELECTROCARDIOGRAM TRACING: CPT

## 2023-01-02 PROCEDURE — 80053 COMPREHEN METABOLIC PANEL: CPT

## 2023-01-02 RX ORDER — FUROSEMIDE 20 MG/1
20 TABLET ORAL 2 TIMES DAILY
Status: DISCONTINUED | OUTPATIENT
Start: 2023-01-02 | End: 2023-01-02 | Stop reason: HOSPADM

## 2023-01-02 RX ORDER — ONDANSETRON 2 MG/ML
4 INJECTION INTRAMUSCULAR; INTRAVENOUS EVERY 4 HOURS PRN
Status: DISCONTINUED | OUTPATIENT
Start: 2023-01-02 | End: 2023-01-02 | Stop reason: HOSPADM

## 2023-01-02 RX ORDER — NITROGLYCERIN 0.4 MG/1
0.4 TABLET SUBLINGUAL ONCE
Status: DISCONTINUED | OUTPATIENT
Start: 2023-01-02 | End: 2023-01-02 | Stop reason: HOSPADM

## 2023-01-02 RX ORDER — LISINOPRIL 20 MG/1
20 TABLET ORAL EVERY MORNING
Status: DISCONTINUED | OUTPATIENT
Start: 2023-01-03 | End: 2023-01-02 | Stop reason: HOSPADM

## 2023-01-02 RX ORDER — OMEPRAZOLE 20 MG/1
40 CAPSULE, DELAYED RELEASE ORAL EVERY MORNING
Status: DISCONTINUED | OUTPATIENT
Start: 2023-01-03 | End: 2023-01-02 | Stop reason: HOSPADM

## 2023-01-02 RX ORDER — HYDRALAZINE HYDROCHLORIDE 20 MG/ML
10 INJECTION INTRAMUSCULAR; INTRAVENOUS EVERY 4 HOURS PRN
Status: DISCONTINUED | OUTPATIENT
Start: 2023-01-02 | End: 2023-01-02 | Stop reason: HOSPADM

## 2023-01-02 RX ORDER — NICOTINE 21 MG/24HR
14 PATCH, TRANSDERMAL 24 HOURS TRANSDERMAL
Status: DISCONTINUED | OUTPATIENT
Start: 2023-01-02 | End: 2023-01-02 | Stop reason: HOSPADM

## 2023-01-02 RX ORDER — CLOPIDOGREL BISULFATE 75 MG/1
75 TABLET ORAL EVERY MORNING
Status: DISCONTINUED | OUTPATIENT
Start: 2023-01-03 | End: 2023-01-02 | Stop reason: HOSPADM

## 2023-01-02 RX ORDER — METOPROLOL SUCCINATE 25 MG/1
25 TABLET, EXTENDED RELEASE ORAL EVERY MORNING
Status: DISCONTINUED | OUTPATIENT
Start: 2023-01-03 | End: 2023-01-02 | Stop reason: HOSPADM

## 2023-01-02 RX ORDER — LORAZEPAM 2 MG/ML
1 INJECTION INTRAMUSCULAR ONCE
Status: DISCONTINUED | OUTPATIENT
Start: 2023-01-02 | End: 2023-01-02 | Stop reason: HOSPADM

## 2023-01-02 RX ORDER — ONDANSETRON 4 MG/1
4 TABLET, ORALLY DISINTEGRATING ORAL EVERY 4 HOURS PRN
Status: DISCONTINUED | OUTPATIENT
Start: 2023-01-02 | End: 2023-01-02 | Stop reason: HOSPADM

## 2023-01-02 RX ORDER — BISACODYL 10 MG
10 SUPPOSITORY, RECTAL RECTAL
Status: DISCONTINUED | OUTPATIENT
Start: 2023-01-02 | End: 2023-01-02 | Stop reason: HOSPADM

## 2023-01-02 RX ORDER — ISOSORBIDE MONONITRATE 30 MG/1
30 TABLET, EXTENDED RELEASE ORAL
Status: COMPLETED | OUTPATIENT
Start: 2023-01-02 | End: 2023-01-02

## 2023-01-02 RX ORDER — NITROGLYCERIN 0.4 MG/1
0.4 TABLET SUBLINGUAL
Status: DISCONTINUED | OUTPATIENT
Start: 2023-01-02 | End: 2023-01-02 | Stop reason: HOSPADM

## 2023-01-02 RX ORDER — ATORVASTATIN CALCIUM 10 MG/1
10 TABLET, FILM COATED ORAL EVERY MORNING
Status: DISCONTINUED | OUTPATIENT
Start: 2023-01-03 | End: 2023-01-02 | Stop reason: HOSPADM

## 2023-01-02 RX ORDER — ENOXAPARIN SODIUM 100 MG/ML
40 INJECTION SUBCUTANEOUS DAILY
Status: DISCONTINUED | OUTPATIENT
Start: 2023-01-02 | End: 2023-01-02 | Stop reason: HOSPADM

## 2023-01-02 RX ORDER — ISOSORBIDE MONONITRATE 30 MG/1
30 TABLET, EXTENDED RELEASE ORAL EVERY MORNING
Qty: 30 TABLET | Refills: 0 | Status: SHIPPED | OUTPATIENT
Start: 2023-01-02

## 2023-01-02 RX ORDER — ASPIRIN 81 MG/1
324 TABLET, CHEWABLE ORAL ONCE
Status: DISCONTINUED | OUTPATIENT
Start: 2023-01-02 | End: 2023-01-02

## 2023-01-02 RX ORDER — ASPIRIN 325 MG
325 TABLET ORAL EVERY MORNING
Status: DISCONTINUED | OUTPATIENT
Start: 2023-01-02 | End: 2023-01-02 | Stop reason: HOSPADM

## 2023-01-02 RX ORDER — POLYETHYLENE GLYCOL 3350 17 G/17G
1 POWDER, FOR SOLUTION ORAL
Status: DISCONTINUED | OUTPATIENT
Start: 2023-01-02 | End: 2023-01-02 | Stop reason: HOSPADM

## 2023-01-02 RX ORDER — AMOXICILLIN 250 MG
2 CAPSULE ORAL 2 TIMES DAILY
Status: DISCONTINUED | OUTPATIENT
Start: 2023-01-02 | End: 2023-01-02 | Stop reason: HOSPADM

## 2023-01-02 RX ADMIN — NICOTINE 14 MG: 14 PATCH TRANSDERMAL at 12:01

## 2023-01-02 RX ADMIN — ISOSORBIDE MONONITRATE 30 MG: 30 TABLET, EXTENDED RELEASE ORAL at 11:11

## 2023-01-02 RX ADMIN — SENNOSIDES AND DOCUSATE SODIUM 2 TABLET: 50; 8.6 TABLET ORAL at 12:02

## 2023-01-02 ASSESSMENT — ENCOUNTER SYMPTOMS
ABDOMINAL PAIN: 0
MYALGIAS: 0
COUGH: 0
PHOTOPHOBIA: 0
WEIGHT LOSS: 0
HEARTBURN: 0
PALPITATIONS: 0
CLAUDICATION: 0
SHORTNESS OF BREATH: 0
BLURRED VISION: 0
HALLUCINATIONS: 0
DIARRHEA: 0
VOMITING: 0
DEPRESSION: 0
DOUBLE VISION: 0
FEVER: 0
HEADACHES: 0
CHILLS: 0
ORTHOPNEA: 0
DIZZINESS: 0
NAUSEA: 0
NECK PAIN: 0
TREMORS: 0
BACK PAIN: 0

## 2023-01-02 ASSESSMENT — HEART SCORE
AGE: 65+
ECG: NORMAL
HEART SCORE: 4
RISK FACTORS: >2 RISK FACTORS OR HX OF ATHEROSCLEROTIC DISEASE
HISTORY: SLIGHTLY SUSPICIOUS
TROPONIN: LESS THAN OR EQUAL TO NORMAL LIMIT

## 2023-01-02 ASSESSMENT — FIBROSIS 4 INDEX: FIB4 SCORE: 0.82

## 2023-01-02 ASSESSMENT — LIFESTYLE VARIABLES: SUBSTANCE_ABUSE: 0

## 2023-01-02 ASSESSMENT — PAIN DESCRIPTION - DESCRIPTORS: DESCRIPTORS: PRESSURE;SHARP

## 2023-01-02 NOTE — CONSULTS
CARDIOLOGY CONSULTATION NOTE      Date of Consultation: 1/2/2023  Consulting Provider: Chest pain    Patient Name: Mikael Cardoso  YOB: 1955  MRN: [id number]    Reason for Consultation:   Chest pain, reported history of severe CAD    History of Present Illness:   Mikael Cardoso is a 67 year-old who has a history of multiple admissions at emergency departments across the country for cardiac work-up and reports a history of coronary artery disease with prior stenting.  Briefly, he presented to the ED with reports of approximately 1 month of chest pain.  His initial cardiac evaluation was unremarkable, however, given a personally reported history of severe CAD with placement of multiple stents in the past, a Cardiology evaluation was requested.    On my review, the patient reports that his chest pain has improved after receiving 7 nitroglycerin tablets, but is still present.  He notes that his chest pain seems to have worsened in the past month, which he states occurred after his PCP changed his medical regimen and discontinued his usual Imdur.  Of note, he did express frustration that he was not receiving morphine for control of his pain, which has helped his pain at other he stated ED visits.  He reports to me that he underwent extensive stenting of the LAD in 2014 by Dr. Rutherford at [facility] and then had balloon angioplasty performed for ISR at [facility].  However, an epic search does not pull up any notes reporting procedures performed by Dr. Rutherford and a search of his prior records indicates that he reported to other practitioners that his stents have been placed in the mid 2000's.    With regard to his past medical history, he has an extensive history of ED visits across the country.  Most recently, he was at [facility] in [place], [place].  He had a nuclear MPI performed on 11/2/2022 it was unremarkable.  However, due to ongoing chest pain he was recommended for a  "cardiac catheterization.  Following this recommendation, the patient left AMA.  He did have an echocardiogram performed during that admission, which showed normal left ventricular systolic function and no severe valvular abnormalities.  He then presented to [facility] in [place], [place] for evaluation of chest pain.  Fortunately, he underwent a CT coronary angiogram at that time, which demonstrated mild coronary artery disease with no obstructive disease noted in the left main coronary artery or proximal left anterior descending coronary artery.  Of note, no stents were noted to be present on his CT as well.  He then returned to the [facility] and by his report, he was recently admitted to [facility].  He notes that they also recommended a cardiac catheterization, however, he then shortly left AMA again.    He has an incredibly extensive medical record, all of which could not be completely reviewed.  However, he is been noted to have suspected drug-seeking behavior going back many years.  He also has changed the dates with regard to when he underwent PCI.  He was evaluated at [facility] in October 2014 and at that time reported a history of prior PCI, however, per Dr. Marito Contreras's note, he  subsequently had a cardiac catheterization performed in 2008 that demonstrated no prior stents.    From prior notes from June 2014 at [facility] \"Mr. Liang is a 59 yo M with a self-reported Hx CAD (patient described x3 MI ('03/'05/'08 with PCI '05) - however was able to get limited records revealing clean coronary cath in 2008 and multiple records reveling many similar/malingering admissions for complaints of cardiac CP where patient would leave AMA before full evaluation, the patient initially presented with chest pain and was admitted to cardiology. Overall patient had non-cardiac chest pain and was again felt to be malingering - patient left AMA on 6/24 before further discussion.\"    Medical History:     Past " Medical History:   Diagnosis Date    CAD (coronary artery disease)     CAD (coronary artery disease) 8/24/2013    CHEST PAIN 8/24/2013    HLD (hyperlipidemia) 8/24/2013    HTN (hypertension) 8/24/2013    Hypercholesteremia     Hypertension     MI (myocardial infarction) (HCC)     Occluded coronary artery stent      Surgical History:     Past Surgical History:   Procedure Laterality Date    OTHER CARDIAC SURGERY  2008    3 stents on seperate ocassions    ANGIOPLASTY      OTHER CARDIAC SURGERY      stents x 3     Family History:   History reviewed. No pertinent family history.    Social History:   The patient is a smoker    Medications and Allergies:     Current Facility-Administered Medications   Medication Dose Route Frequency Provider Last Rate Last Admin    senna-docusate (PERICOLACE or SENOKOT S) 8.6-50 MG per tablet 2 Tablet  2 Tablet Oral BID Daron Marino M.D.   2 Tablet at 01/02/23 1202    And    polyethylene glycol/lytes (MIRALAX) PACKET 1 Packet  1 Packet Oral QDAY PRN Daron Marino M.D.        And    magnesium hydroxide (MILK OF MAGNESIA) suspension 30 mL  30 mL Oral QDAY PRN Daron Marino M.D.        And    bisacodyl (DULCOLAX) suppository 10 mg  10 mg Rectal QDAY PRN Daron Marino M.D.        enoxaparin (Lovenox) inj 40 mg  40 mg Subcutaneous DAILY AT 1800 Daron Marino M.D.        hydrALAZINE (APRESOLINE) injection 10 mg  10 mg Intravenous Q4HRS PRN Daron Marino M.D.        ondansetron (ZOFRAN) syringe/vial injection 4 mg  4 mg Intravenous Q4HRS PRN Daron Marino M.D.        ondansetron (ZOFRAN ODT) dispertab 4 mg  4 mg Oral Q4HRS PRN Daron Marino M.D.        aspirin (ASA) tablet 325 mg  325 mg Oral SOHA Marino M.D.        [START ON 1/3/2023] omeprazole (PRILOSEC) capsule 40 mg  40 mg Oral SOHA Marino M.D.        nitroglycerin (NITROSTAT) tablet 0.4 mg  0.4 mg Sublingual Q5 MIN PRMAXIM Marino M.D.        [START ON 1/3/2023] metoprolol SR (TOPROL XL)  "tablet 25 mg  25 mg Oral SOHA Marino M.D.        [START ON 1/3/2023] lisinopril (PRINIVIL) tablet 20 mg  20 mg Oral SOHA Marino M.D.        furosemide (LASIX) tablet 20 mg  20 mg Oral BID Daron Marino M.D.        [START ON 1/3/2023] clopidogrel (PLAVIX) tablet 75 mg  75 mg Oral SOHA Marino M.D.        [START ON 1/3/2023] atorvastatin (LIPITOR) tablet 10 mg  10 mg Oral SOHA Marino M.D.        nitroglycerin (NITROSTAT) tablet 0.4 mg  0.4 mg Sublingual Once Daron Marino M.D.        nicotine (NICODERM) 14 MG/24HR 14 mg  14 mg Transdermal Daily-0600 Daron Marino M.D.   14 mg at 01/02/23 1201    insulin regular (HumuLIN R,NovoLIN R) injection  2-9 Units Subcutaneous 4X/DAY ACHS Daron Marino M.D.        And    dextrose 10 % BOLUS 25 g  25 g Intravenous Q15 MIN PRN Daron Marino M.D.        LORazepam (ATIVAN) injection 1 mg  1 mg Intravenous Once Daron Marino M.D.         Allergies   Allergen Reactions    Lidocaine Hcl Anaphylaxis     \"All irvin.\"    Ketorolac Tromethamine Hives    Metoclopramide Anxiety     Claustrophobia.     Review of Systems:   A pertinent review of systems was performed and was unremarkable except as per HPI above.    Vital Signs:   BP (!) 180/77   Pulse 67   Temp 36.4 °C (97.5 °F) (Temporal)   Resp 18   Ht 1.676 m (5' 6\")   Wt 86.2 kg (190 lb)   SpO2 95%   BMI 30.67 kg/m²   Vitals:    01/02/23 0635 01/02/23 0852 01/02/23 1000 01/02/23 1115   BP:  (!) 161/87 (!) 177/89 (!) 180/77   Pulse:  65 63 67   Resp:  18 18 18   Temp:    36.4 °C (97.5 °F)   TempSrc:    Temporal   SpO2:  97% 96% 95%   Weight: 86.2 kg (190 lb)      Height: 1.676 m (5' 6\")        Body mass index is 30.67 kg/m².  Oxygen Therapy:  Pulse Oximetry: 95 %, O2 Delivery Device: None - Room Air    Physical Examination:   General: Well-appearing, no acute distress  Eyes: Extraocular movements intact, anicteric  HENT: Neck full range of motion, no jugular venous " distension  Pulmonary: Normal respiratory effort, no distress  Cardiovascular: Regular rate, regular rhythm  Gastrointestinal: Obese, nondistended  Extremities: Warm and well perfused, no lower extremity edema  Neurological: Alert and oriented, no gross focal motor deficits  Psychiatric: Anxious, pressured speech    Laboratories:   Estimated Creatinine Clearance: 120.7 mL/min (by C-G formula based on SCr of 0.62 mg/dL).  Recent Labs     01/02/23  0653   CREATININE 0.62   BUN 11   POTASSIUM 4.1   SODIUM 136   CALCIUM 9.0   CO2 18*   ALBUMIN 3.9     Recent Labs     01/02/23  0653   GLUCOSE 144*     Recent Labs     01/02/23  0653   ASTSGOT 18   ALTSGPT 18   ALKPHOSPHAT 92     Recent Labs     01/02/23  0653   WBC 5.7   HEMOGLOBIN 13.4*   PLATELETCT 264     Recent Labs     01/02/23  0653 01/02/23  0958   TROPONINT 18 19     Lab Results   Component Value Date/Time    LDL 40 07/31/2022 1451    LDL 80 10/30/2014 0107    LDL 26 08/25/2013 0228     Lab Results   Component Value Date/Time    HDL 36 (L) 08/20/2022 1348    HDL 28 (L) 07/31/2022 1451    HDL 32 (A) 10/30/2014 0107       Lab Results   Component Value Date/Time    TRIGLYCERIDE 147 08/20/2022 1348    TRIGLYCERIDE 146 07/31/2022 1451    TRIGLYCERIDE 198 (H) 10/30/2014 0107       Lab Results   Component Value Date/Time    CHOLSTRLTOT 140 08/20/2022 1348    CHOLSTRLTOT 97 07/31/2022 1451    CHOLSTRLTOT 152 10/30/2014 0107     Studies:   See HPI above for review of recent pertinent studies.    Assessment and Recommendations:   As noted above, the patient reports a history of multiple prior PCI.  However, objective evidence including a cardiac catheterization 2008 and a very recent CT coronary angiogram in December 2022 indicate that the patient does not have any stents.  Interestingly, he also has left AMA from 2 hospitals recently after a recommendation to pursue cardiac catheterization.  Based on these findings, it appears that he is not truthful about his past medical  history.  Additionally, he has been noted by multiple prior providers to have suspected malingering and drug-seeking behavior.  As he does not have objective evidence of significant myocardial injury with unremarkable troponins and a nonischemic ECG, given that he has had a recent unremarkable CT coronary angiogram and several recent stress tests and echocardiograms without concerning findings, I would not recommend any further cardiac evaluation at this time.  I would also recommend avoiding administration of opiate medications.  He does report that Imdur helps his symptoms, which may simply be due to malingering, but may have some component of microvascular dysfunction.  He can be restarted on Imdur as an outpatient and follow up with his PCP.  Otherwise, he can be continued on his usual home medications for management of his underlying cardiac risk factors including known mild coronary artery disease, hyperlipidemia, hypertension, and diabetes.    This patient encounter required an extensive review of the patient's past medical history and a complex assessment of whether his symptoms were due to active ischemia or possible malingering.  Medical decision making in this case was highly complex.        Finesse Toussaint MD, [facility]  Interventional Cardiology  [facility], [address]  [address] [address]  Phone: [phone]  Fax: [phone]

## 2023-01-02 NOTE — H&P
Hospital Medicine History & Physical Note    Date of Service  1/2/2023    Primary Care Physician  Sarkis Russell M.D.    Consultants  Dr partida cards    Code Status  Full Code    Chief Complaint  Chief Complaint   Patient presents with    Chest Pain     Started about 2 hours ago. C/O: Central chest crushing sensation, radiating to the L arm, pt reports he took 3 nitros tonight, no relief. Pt reports he was going to have a heart cath x 3 weeks ago, pt left AMA.       History of Presenting Illness  Mikael Cardoso is a 67 y.o. male who presented 1/2/2023 with past medical history of A. Fib, coronary disease, hypertension, diabetes mellitus type 2 comes emergency department complaining of chest discomfort.  This chest discomfort has been going on for the last 3 weeks.  They describe this pain as a pressure-like pain in the left side of his chest radiating to his left jaw and left arm.  He apparently has been taking a sublingual nitroglycerin without significant improvement in his pain.  I was called to evaluate and admit this patient for possible ACS.  ER physician had consulted the on-call cardiologist who recommended patient may benefit from a cardiac CT. however upon further review of his old charts, it has been apparent that the patient has been hospital and doctor shopping and been worked up extensively cardiovascular including a cardiac CT earlier this month that was negative.  There is impingement inconsistencies with his report of coronary disease and cardiac stent.  We cannot find any documentation of a stent being for imaging to collaborate it.  He apparently has been in multiple hospitals complaining of chest pain and appears to know exactly what to say to be admitted for further cardiac investigation.  Old chart reviews show that as soon as narcotics are withdrawn or not offered , patient leaves AGAINST MEDICAL ADVICE    Discussed with cardiology Dr. Partida-patient recommended to be started on Imdur    At the  "time of my exam patient was chest pain-free        I discussed the plan of care with patient.    Review of Systems  Review of Systems   Constitutional:  Negative for chills, fever, malaise/fatigue and weight loss.   HENT:  Negative for ear discharge, ear pain, hearing loss and tinnitus.    Eyes:  Negative for blurred vision, double vision and photophobia.   Respiratory:  Negative for cough and shortness of breath.    Cardiovascular:  Positive for chest pain. Negative for palpitations, orthopnea and claudication.   Gastrointestinal:  Negative for diarrhea, heartburn, nausea and vomiting.   Genitourinary:  Negative for dysuria, frequency, hematuria and urgency.   Musculoskeletal:  Negative for back pain, myalgias and neck pain.   Neurological:  Negative for dizziness, tremors and headaches.   Psychiatric/Behavioral:  Negative for depression, hallucinations, substance abuse and suicidal ideas.      Past Medical History   has a past medical history of CAD (coronary artery disease), CAD (coronary artery disease) (8/24/2013), CHEST PAIN (8/24/2013), HLD (hyperlipidemia) (8/24/2013), HTN (hypertension) (8/24/2013), Hypercholesteremia, Hypertension, MI (myocardial infarction) (HCC), and Occluded coronary artery stent.    Surgical History   has a past surgical history that includes angioplasty; other cardiac surgery; and other cardiac surgery (2008).     Family History    Family history reviewed with patient. There is no family history that is pertinent to the chief complaint.     Social History   reports that he has been smoking cigarettes. He has been smoking an average of .5 packs per day. He has never used smokeless tobacco. He reports that he does not currently use alcohol. He reports that he does not use drugs.    Allergies  Allergies   Allergen Reactions    Lidocaine Hcl Anaphylaxis     \"All irvin.\"    Ketorolac Tromethamine Hives    Metoclopramide Anxiety     Claustrophobia.       Medications  Prior to Admission " Medications   Prescriptions Last Dose Informant Patient Reported? Taking?   Empagliflozin-metFORMIN HCl ER (SYNJARDY XR) 12.5-1000 MG TABLET SR 24 HR 1/2/2023 at AM Patient Yes No   Sig: Take 1 Tablet by mouth 2 times a day.   Iron-Vitamins (GERITOL PO) 1/1/2023 at PM Patient Yes Yes   Sig: Take 1 Tablet by mouth every day.   Multiple Vitamins-Minerals (CENTRUM SILVER 50+MEN PO) 1/2/2023 at AM Patient Yes Yes   Sig: Take 1 Tablet by mouth every day.   Non Formulary Request 1/2/2023 at AM Patient Yes No   Sig: Take 1 Packet by mouth every morning. ** DAILY DIABETES HEALTH PACK **   Semaglutide,0.25 or 0.5MG/DOS, (OZEMPIC, 0.25 OR 0.5 MG/DOSE,) 2 MG/1.5ML Solution Pen-injector 12/29/2022 at UNK Patient Yes No   Sig: Inject 0.5 mg under the skin every thursday.   apixaban (ELIQUIS) 5mg Tab 1/2/2023 at AM Patient Yes Yes   Sig: Take 5 mg by mouth 2 times a day.   aspirin (ASA) 325 MG TABS 1/1/2023 at AM Patient Yes No   Sig: Take 325 mg by mouth every morning.   aspirin EC (ECOTRIN) 325 MG Tablet Delayed Response 1/2/2023 at AM Patient Yes Yes   Sig: Take 650 mg by mouth one time.   atorvastatin (LIPITOR) 10 MG Tab 1/2/2023 at AM Patient Yes No   Sig: Take 10 mg by mouth every morning.   clopidogrel (PLAVIX) 75 MG TABS 1/2/2023 at AM Patient Yes No   Sig: Take 75 mg by mouth every morning.   furosemide (LASIX) 20 MG Tab 1/2/2023 at AM Patient Yes No   Sig: Take 20 mg by mouth 2 times a day.   lisinopril (PRINIVIL) 20 MG Tab 1/2/2023 at AM Patient Yes No   Sig: Take 20 mg by mouth every morning.   metoprolol SR (TOPROL XL) 25 MG TABLET SR 24 HR 1/2/2023 at AM Patient Yes No   Sig: Take 25 mg by mouth every morning.   nitroglycerin (NITROSTAT) 0.4 MG SUBL 1/2/2023 at 0700 Patient Yes No   Sig: Place 0.4 mg under tongue every 5 minutes as needed. Indications: Chest pain   omeprazole (PRILOSEC) 40 MG delayed-release capsule 1/2/2023 at AM Patient Yes No   Sig: Take 40 mg by mouth every morning.      Facility-Administered  Medications: None       Physical Exam  Temp:  [36.1 °C (97 °F)] 36.1 °C (97 °F)  Pulse:  [65-89] 65  Resp:  [18-20] 18  BP: (136-161)/(87-99) 161/87  SpO2:  [97 %] 97 %  Blood Pressure : (!) 161/87   Temperature: 36.1 °C (97 °F)   Pulse: 65   Respiration: 18   Pulse Oximetry: 97 %       Physical Exam  Constitutional:       General: He is not in acute distress.     Appearance: He is obese. He is not ill-appearing, toxic-appearing or diaphoretic.   HENT:      Head: Normocephalic.      Mouth/Throat:      Mouth: Mucous membranes are dry.      Pharynx: No oropharyngeal exudate.   Cardiovascular:      Rate and Rhythm: Normal rate and regular rhythm.      Pulses: Normal pulses.      Heart sounds: Normal heart sounds.   Pulmonary:      Effort: Pulmonary effort is normal. No respiratory distress.      Breath sounds: Normal breath sounds. No stridor. No rhonchi.   Abdominal:      General: Abdomen is flat. There is no distension.      Palpations: There is no mass.      Tenderness: There is no abdominal tenderness. There is no rebound.      Hernia: No hernia is present.   Musculoskeletal:         General: Normal range of motion.      Cervical back: Normal range of motion and neck supple. No tenderness.      Right lower leg: No edema.      Left lower leg: No edema.   Skin:     Capillary Refill: Capillary refill takes 2 to 3 seconds.      Coloration: Skin is not jaundiced or pale.      Findings: No bruising or lesion.   Neurological:      General: No focal deficit present.      Cranial Nerves: No cranial nerve deficit.      Motor: No weakness.   Psychiatric:         Mood and Affect: Mood normal.       Laboratory:  Recent Labs     01/02/23  0653   WBC 5.7   RBC 4.31*   HEMOGLOBIN 13.4*   HEMATOCRIT 42.0   MCV 97.4   MCH 31.1   MCHC 31.9*   RDW 42.8   PLATELETCT 264   MPV 9.3     Recent Labs     01/02/23  0653   SODIUM 136   POTASSIUM 4.1   CHLORIDE 104   CO2 18*   GLUCOSE 144*   BUN 11   CREATININE 0.62   CALCIUM 9.0     Recent  Labs     01/02/23  0653   ALTSGPT 18   ASTSGOT 18   ALKPHOSPHAT 92   TBILIRUBIN 0.5   GLUCOSE 144*         No results for input(s): NTPROBNP in the last 72 hours.      Recent Labs     01/02/23  0653 01/02/23  0958   TROPONINT 18 19       Imaging:  DX-CHEST-PORTABLE (1 VIEW)   Final Result      Borderline cardiac silhouette enlargement and possible mild vascular congestion.      CT-CTA HEART W/3D IMAGE    (Results Pending)       X-Ray:  I have personally reviewed the images and compared with prior images.    Assessment/Plan:  Justification for Admission Status  I anticipate this patient is appropriate for observation status at this time because expect patient require less than 48 hours for further evaluation of this complaint of chest discomfort        * Chest pain- (present on admission)  Assessment & Plan  The 10-year ASCVD risk score (Rachele ALTMAN, et al., 2019) is: 42.6%    Values used to calculate the score:      Age: 67 years      Sex: Male      Is Non- : No      Diabetic: Yes      Tobacco smoker: Yes      Systolic Blood Pressure: 147 mmHg      Is BP treated: Yes      HDL Cholesterol: 36 mg/dL      Total Cholesterol: 140 mg/dL       ==========================================    Patient's complaints classic  for anginal pain - like he read it out of a textbook.     he has had an extensive work-up at multiple hospitals -negative for coronary disease requiring intervention.    Patient has exhibited drug-seeking behavior and has been hospital shopping and doctor shopping    Patient has been inconsistent with his medical history in regards to his supposed heart disease    His cardiac interventions include recent cardiac stress test and cardiac CT      After speaking with the cardiologist Dr. Toussaint-it was clear that no further cardiac work-up was warranted at this time and patient medically cleared for discharge home with Imdur 30 mg p.o. daily and to follow-up with his PCP and cardiologist in  the outpatient setting    Type 2 diabetes mellitus with hyperglycemia, without long-term current use of insulin (HCC)- (present on admission)  Assessment & Plan  Diabetic diet    Fingersticks with sliding scale insulin    Primary hypertension- (present on admission)  Assessment & Plan  Continue home medications    Patient started on Imdur 30 mg daily        Patient was admitted and discharged on the same date of 1--2-23.  Total time taken greater than 55 minutes    VTE prophylaxis: SCDs/TEDs

## 2023-01-02 NOTE — ED NOTES
Med rec completed per pt and med list (returned)   Allergies reviewed  No PO antibiotics in the last 30 days    Pt states that he took 7 doses of Nitro today (1/2/2023) with the last dose being around 0700  Pt states that he took Asprin 650 mg this AM (1/2/2023)

## 2023-01-02 NOTE — PROGRESS NOTES
"Report received from  Marni FR[place]. Pt. BIB to room by Floyd Pt is sitting at edge of bed, MD at bedside, Pt is upset pointing his finger to MD while he's getting DC. Able to check VSS.   DC lounge orders in. Per request IV removed. Pt states is not waiting for DC lounge and walk away. States \"I'm going to [facility]\"     "

## 2023-01-02 NOTE — ED NOTES
Spoke to [place] of CT; CT procedure will not be done today as patient needs to be npo FOR 6HRS; called ROMÁN Martinez; she will come and get the patient

## 2023-01-02 NOTE — ED TRIAGE NOTES
"Chief Complaint   Patient presents with   • Chest Pain     Started about 2 hours ago. C/O: Central chest crushing sensation, radiating to the L arm, pt reports he took 3 nitros tonight, no relief. Pt reports he was going to have a heart cath x 3 weeks ago, pt left AMA.       67M to triage for above complaint. Patient reports he was at work when he Started having chest pain, started about 2 hours ago. C/O: Central chest crushing sensation, radiating to the L arm, pt reports he took 3 nitros tonight, no relief. Pt reports he was going to have a heart cath x 3 weeks ago, pt left AMA. Pt has an extensive cardiac history : MIs with stents, afib, CHF, DM2, HTN. Patient reports the pain feels similar to his other MIs. Pt reports he is scheduled to see his cardiologist, but only at the third week of January. GCS 15    Pt is alert and oriented, speaking in full sentences, follows commands and responds appropriately to questions. NAD. Resp are even and unlabored.      Pt placed in lobby. Pt educated on triage process. Pt encouraged to alert staff for any changes.     Patient and staff wearing appropriate PPE    BP (!) 136/99   Pulse 89   Temp 36.1 °C (97 °F) (Temporal)   Resp 20   Ht 1.676 m (5' 6\")   Wt 86.2 kg (190 lb)   SpO2 97%   BMI 30.67 kg/m²    "

## 2023-01-02 NOTE — DISCHARGE INSTRUCTIONS
Chest Wall Pain  Chest wall pain is pain in or around the bones and muscles of your chest. Sometimes, an injury causes this pain. Excessive coughing or overuse of arm and chest muscles may also cause chest wall pain. Sometimes, the cause may not be known. This pain may take several weeks or longer to get better.  Follow these instructions at home:  Managing pain, stiffness, and swelling    If directed, put ice on the painful area:  Put ice in a plastic bag.  Place a towel between your skin and the bag.  Leave the ice on for 20 minutes, 2-3 times per day.  Activity  Rest as told by your health care provider.  Avoid activities that cause pain. These include any activities that use your chest muscles or your abdominal and side muscles to lift heavy items. Ask your health care provider what activities are safe for you.  General instructions    Take over-the-counter and prescription medicines only as told by your health care provider.  Do not use any products that contain nicotine or tobacco, such as cigarettes, e-cigarettes, and chewing tobacco. These can delay healing after injury. If you need help quitting, ask your health care provider.  Keep all follow-up visits as told by your health care provider. This is important.  Contact a health care provider if:  You have a fever.  Your chest pain becomes worse.  You have new symptoms.  Get help right away if:  You have nausea or vomiting.  You feel sweaty or light-headed.  You have a cough with mucus from your lungs (sputum) or you cough up blood.  You develop shortness of breath.  These symptoms may represent a serious problem that is an emergency. Do not wait to see if the symptoms will go away. Get medical help right away. Call your local emergency services (911 in the [place] Do not drive yourself to the hospital.  Summary  Chest wall pain is pain in or around the bones and muscles of your chest.  Depending on the cause, it may be treated with ice, rest, medicines, and  avoiding activities that cause pain.  Contact a health care provider if you have a fever, worsening chest pain, or new symptoms.  Get help right away if you feel light-headed or you develop shortness of breath. These symptoms may be an emergency.  This information is not intended to replace advice given to you by your health care provider. Make sure you discuss any questions you have with your health care provider.  Document Released: 12/18/2006 Document Revised: 06/20/2019 Document Reviewed: 06/20/2019  [employer]vier Patient Education © 2020 [employer]

## 2023-01-02 NOTE — ED PROVIDER NOTES
ER Provider Note    Scribed for Lois Rene M.d. by Raciel Perez. 1/2/2023  8:50 AM    Primary Care Provider: Sarkis Russell M.D.    CHIEF COMPLAINT  Chief Complaint   Patient presents with    Chest Pain     Started about 2 hours ago. C/O: Central chest crushing sensation, radiating to the L arm, pt reports he took 3 nitros tonight, no relief. Pt reports he was going to have a heart cath x 3 weeks ago, pt left AMA.       HPI  Mikael Cardoso is a 67 y.o. male who presents to the ED complaining of central chest pain onset 3 weeks ago. The pain radiates to her left arm. He initially went to [facility], but did not trust the staff, so he left.  He states that there was reportedly someone looking through his belongings in the middle of the night when he was hospitalized there.  He called his primary care provider, who scheduled him for outpatient follow-up with cardiology. Since then, his pain has been acting up.  He reports intermittent bouts of pressure-like chest pain in his left side rating to his left jaw and left arm.  This feels like his anginal equivalent.  The pain is moderate and pressure-like.  He has been taking nitroglycerin without alleviation of pain. He denies any shortness of breath, or leg swelling.  He states in the past he was on Imdur which helped his symptoms but had been taken off of it a year ago.    EXTERNAL RECORDS REVIEWED  Hospital Records Review    The patient was admitted and seen 12/2/22 in [place]. He reportedly has a history of myocardial infarction, status post-stent in 2015 and 2017. He has a history of paroxysmal AFIB, diabetes, hypertension, hyperlipidemia, and ongoing tobacco use. He last had  an echocardiogram in November of 2022 that showed an EF of 64%. As per discharge summaries, he has had multiple negative cardiac work-ups. A stress test in August of 2022 was negative for acute ischemia. The patient goes to hospitals all over the country for similar  complaints and always leaves AMA.      I did discuss this with patient and he reports that he does not feel comfortable staying in outside facilities given that he lives here in [place].  However per record review patient has also been hospitalized in [place] and left AMA there.    LIMITATION TO HISTORY   Select: : None    OUTSIDE HISTORIAN(S):  Select: None    REVIEW OF SYSTEMS  Review of Systems   Constitutional:  Negative for fever.   Respiratory:  Negative for shortness of breath.    Cardiovascular:  Positive for chest pain. Negative for leg swelling.   Gastrointestinal:  Negative for abdominal pain, nausea and vomiting.   All other systems reviewed and are negative.     PAST MEDICAL HISTORY  Past Medical History:   Diagnosis Date    CAD (coronary artery disease)     CAD (coronary artery disease) 8/24/2013    CHEST PAIN 8/24/2013    HLD (hyperlipidemia) 8/24/2013    HTN (hypertension) 8/24/2013    Hypercholesteremia     Hypertension     MI (myocardial infarction) (HCC)     Occluded coronary artery stent        SURGICAL HISTORY  Past Surgical History:   Procedure Laterality Date    OTHER CARDIAC SURGERY  2008    3 stents on seperate ocassions    ANGIOPLASTY      OTHER CARDIAC SURGERY      stents x 3       FAMILY HISTORY  History reviewed. No pertinent family history.    SOCIAL HISTORY   reports that he has been smoking cigarettes. He has been smoking an average of .5 packs per day. He has never used smokeless tobacco. He reports that he does not currently use alcohol. He reports that he does not use drugs.    CURRENT MEDICATIONS  Previous Medications    APIXABAN (ELIQUIS) 5MG TAB    Take 5 mg by mouth 2 times a day.    ASPIRIN (ASA) 325 MG TABS    Take 325 mg by mouth every morning.    ASPIRIN EC (ECOTRIN) 325 MG TABLET DELAYED RESPONSE    Take 650 mg by mouth one time.    ATORVASTATIN (LIPITOR) 10 MG TAB    Take 10 mg by mouth every morning.    CLOPIDOGREL (PLAVIX) 75 MG TABS    Take 75 mg by mouth every morning.  "   EMPAGLIFLOZIN-METFORMIN HCL ER (SYNJARDY XR) 12.5-1000 MG TABLET SR 24 HR    Take 1 Tablet by mouth 2 times a day.    FUROSEMIDE (LASIX) 20 MG TAB    Take 20 mg by mouth 2 times a day.    IRON-VITAMINS (GERITOL PO)    Take 1 Tablet by mouth every day.    LISINOPRIL (PRINIVIL) 20 MG TAB    Take 20 mg by mouth every morning.    METOPROLOL SR (TOPROL XL) 25 MG TABLET SR 24 HR    Take 25 mg by mouth every morning.    MULTIPLE VITAMINS-MINERALS (CENTRUM SILVER 50+MEN PO)    Take 1 Tablet by mouth every day.    NITROGLYCERIN (NITROSTAT) 0.4 MG SUBL    Place 0.4 mg under tongue every 5 minutes as needed. Indications: Chest pain    NON FORMULARY REQUEST    Take 1 Packet by mouth every morning. ** DAILY DIABETES HEALTH PACK **    OMEPRAZOLE (PRILOSEC) 40 MG DELAYED-RELEASE CAPSULE    Take 40 mg by mouth every morning.    SEMAGLUTIDE,0.25 OR 0.5MG/DOS, (OZEMPIC, 0.25 OR 0.5 MG/DOSE,) 2 MG/1.5ML SOLUTION PEN-INJECTOR    Inject 0.5 mg under the skin every thursday.       ALLERGIES  Ender-1 [lidocaine hcl], Lidocaine hcl, Other drug, Ketorolac tromethamine, and Metoclopramide    PHYSICAL EXAM  BP (!) 161/87   Pulse 65   Temp 36.1 °C (97 °F) (Temporal)   Resp 18   Ht 1.676 m (5' 6\")   Wt 86.2 kg (190 lb)   SpO2 97%   BMI 30.67 kg/m²   Nursing note and vitals reviewed.  Constitutional: Well-developed and well-nourished. No acute distress.  HENT: Head is normocephalic and atraumatic.  Eyes: extra-ocular movements intact  Cardiovascular: Regular rate and regular rhythm. No murmur heard.  Pulmonary/Chest: Breath sounds normal. No wheezes or rales.   Abdominal: Soft and non-tender. No distention.    Musculoskeletal: Extremities exhibit normal range of motion without edema or tenderness.   Neurological: Awake and alert  Skin: Skin is warm and dry. No rash.        DIAGNOSTIC STUDIES      Labs Reviewed   CBC WITH DIFFERENTIAL - Abnormal; Notable for the following components:       Result Value    RBC 4.31 (*)     Hemoglobin " 13.4 (*)     MCHC 31.9 (*)     Lymphocytes 21.70 (*)     All other components within normal limits    Narrative:     Biotin intake of greater than 5 mg per day may interfere with  troponin levels, causing false low values.   COMP METABOLIC PANEL - Abnormal; Notable for the following components:    Co2 18 (*)     Glucose 144 (*)     All other components within normal limits    Narrative:     Biotin intake of greater than 5 mg per day may interfere with  troponin levels, causing false low values.   TROPONIN    Narrative:     Biotin intake of greater than 5 mg per day may interfere with  troponin levels, causing false low values.   TROPONIN    Narrative:     Biotin intake of greater than 5 mg per day may interfere with  troponin levels, causing false low values.   BLOOD CULTURE,HOLD    Narrative:     Biotin intake of greater than 5 mg per day may interfere with  troponin levels, causing false low values.   CORRECTED CALCIUM    Narrative:     Biotin intake of greater than 5 mg per day may interfere with  troponin levels, causing false low values.   ESTIMATED GFR    Narrative:     Biotin intake of greater than 5 mg per day may interfere with  troponin levels, causing false low values.       EKG:   EKG at 6:32 AM: Normal sinus rhythm, heart rate 68, normal axis, normal intervals, , , QTc 441, no acute ST-T segment changes, no dynamic changes when compared to prior EKG from April 2022, no evidence of acute arrhythmia or ischemia per my interpretation    Radiology:     DX-CHEST-PORTABLE (1 VIEW)   Final Result      Borderline cardiac silhouette enlargement and possible mild vascular congestion.          The attending emergency physician has independently interpreted the diagnostic imaging associated with this visit and am waiting the final reading from the radiologist.       COURSE & MEDICAL DECISION MAKING     Nursing notes, vital signs, [id number] reviewed in chart     ED Observation Status? No; Patient does not  meet criteria for ED Observation.     INITIAL ASSESSMENT AND PLAN  Prior records reviewed which indicate the patient was admitted and seen 12/22. He has a history of myocardial infarction, status post-stent in 2015 and 2017. He has a history of AFIB, diabetes, hypertension, hyperlipidemia, and ongoing tobacco use. He last had  an echocardiogram in November of 2022 that showed an EF of 64%. Upon further review, his history of a myocardial infarction is questionable. As per discharge summaries, he has had multiple negative cardiac work-ups. A stress test in August of 2022 was negative for ischemia. The patient goes to hospitals all over the country for similar complaints and always leaves AMA.    Escalation of care considered, and ultimately not performed: See below    Barriers to care at this time, including but not limited to: None.    Diagnostic tests and prescription drugs considered including, but not limited to: See below.    9:03 AM - Patient seen and examined and bedside. Discussed plan of care including labs and imaging. He is understandable and agreeable with the plan of care. Patient will be treated for pain.    9:35 AM - I discussed the patient's case and the above findings with Dr. Hutton (Cardiology) who I discussed plan of care with.    10:26 AM - Patient was reevaluated at bedside. Spoke to the patient about my consultation with Dr. Hutton. Updated him on the plan for hospitalization. He is understandable and agreeable with the plan for hospitalization.     10:47 AM - I discussed the patient's case and the above findings with Dr. Marino (Hospitalist) who will hospitalize the patient.     FINAL PROBLEM LIST AND DISPOSITION    In addition to the chief complaint, the following problems were addressed: none    I have discussed management of the patient with the following physicians and AVANI's:  Dr. Marino (Hospitalist), Dr. Hutton (cardiologist)    Discussion of management with other [facility] or appropriate  source(s): Dr. Marino (Hospitalist), Dr. Hutotn (cardiologist)    DISCUSSION  Patient is a 67-year-old male who comes in for evaluation of chest pain.  Differential diagnosis includes unstable angina, acute coronary syndrome, arrhythmia, electrolyte derangement.  Diagnostic work-up includes labs, EKG and chest x-ray.    Patient's initial vitals are notable for hypertension, otherwise unremarkable.  He reports that he already took aspirin this morning.  EKG returns demonstrates no evidence of acute arrhythmia or ischemia.  Labs returned are unremarkable.  Troponin is within normal limits.  Patient is treated with Imdur for his symptoms.  Patient has a heart score of 4 and symptoms are concerning for unstable angina.  However recent work-up is reassuring with a normal stress test in August of this year.  I discussed the case with cardiologist  who agrees that symptoms are concerning for unstable angina, recommends trending troponins and noninvasive testing at this time.  As he had a recent stress test would recommend CT of the coronaries.  Patient will be hospitalized for further work-up and management.  Cardiology will consult.  Discussed the case with Dr. Marino who is excepted patient for hospitalization.  Patient is in guarded condition.    Patient will be hospitalized by Dr. Marino in guarded condition.    FINAL IMPRESSION   1. Chest pain, unspecified type             The note accurately reflects work and decisions made by me.  Lois Rene M.D.  1/2/2023  11:16 AM     Raciel DURHAM (Jamesonibnilsa), am scribing for, and in the presence of, Lois Rene M.D..    Electronically signed by: Raciel Perez ([place]), 1/2/2023    Lois DURHAM M.D. personally performed the services described in this documentation, as scribed by Raciel Perez in my presence, and it is both accurate and complete.    C

## 2023-01-03 NOTE — ASSESSMENT & PLAN NOTE
The 10-year ASCVD risk score (Rachele ALTMAN, et al., 2019) is: 42.6%    Values used to calculate the score:      Age: 67 years      Sex: Male      Is Non- : No      Diabetic: Yes      Tobacco smoker: Yes      Systolic Blood Pressure: 147 mmHg      Is BP treated: Yes      HDL Cholesterol: 36 mg/dL      Total Cholesterol: 140 mg/dL       ==========================================    Patient's complaints classic  for anginal pain - like he read it out of a textbook.     he has had an extensive work-up at multiple hospitals -negative for coronary disease requiring intervention.    Patient has exhibited drug-seeking behavior and has been hospital shopping and doctor shopping    Patient has been inconsistent with his medical history in regards to his supposed heart disease    His cardiac interventions include recent cardiac stress test and cardiac CT      After speaking with the cardiologist Dr. Toussaint-it was clear that no further cardiac work-up was warranted at this time and patient medically cleared for discharge home with Imdur 30 mg p.o. daily and to follow-up with his PCP and cardiologist in the outpatient setting

## 2023-04-05 ENCOUNTER — APPOINTMENT (OUTPATIENT)
Dept: GENERAL RADIOLOGY | Age: 68
End: 2023-04-05

## 2023-04-05 ENCOUNTER — HOSPITAL ENCOUNTER (OUTPATIENT)
Age: 68
Setting detail: OBSERVATION
Discharge: HOME | End: 2023-04-11
Attending: HOSPITALIST | Admitting: HOSPITALIST

## 2023-04-05 DIAGNOSIS — R07.9 CHEST PAIN, UNSPECIFIED TYPE: Primary | ICD-10-CM

## 2023-04-05 LAB
ALBUMIN SERPL-MCNC: 3.9 G/DL (ref 3.6–5.1)
ALBUMIN/GLOB SERPL: 1.1 {RATIO} (ref 1–2.4)
ALP SERPL-CCNC: 83 UNITS/L (ref 45–117)
ALT SERPL-CCNC: 30 UNITS/L
AMPHETAMINES UR QL SCN>500 NG/ML: NEGATIVE
ANION GAP SERPL CALC-SCNC: 12 MMOL/L (ref 7–19)
APTT PPP: 34 SEC (ref 22–30)
AST SERPL-CCNC: 20 UNITS/L
ATRIAL RATE (BPM): 76
BARBITURATES UR QL SCN>200 NG/ML: NEGATIVE
BASOPHILS # BLD: 0 K/MCL (ref 0–0.3)
BASOPHILS NFR BLD: 1 %
BENZODIAZ UR QL SCN>200 NG/ML: NEGATIVE
BILIRUB SERPL-MCNC: 0.5 MG/DL (ref 0.2–1)
BUN SERPL-MCNC: 21 MG/DL (ref 6–20)
BUN/CREAT SERPL: 26 (ref 7–25)
BZE UR QL SCN>150 NG/ML: NEGATIVE
CALCIUM SERPL-MCNC: 9.3 MG/DL (ref 8.4–10.2)
CANNABINOIDS UR QL SCN>50 NG/ML: NEGATIVE
CHLORIDE SERPL-SCNC: 108 MMOL/L (ref 97–110)
CO2 SERPL-SCNC: 24 MMOL/L (ref 21–32)
CREAT SERPL-MCNC: 0.82 MG/DL (ref 0.67–1.17)
DEPRECATED RDW RBC: 43.8 FL (ref 39–50)
DEPRECATED RDW RBC: 44.1 FL (ref 39–50)
DIASTOLIC BLOOD PRESSURE: 93
EOSINOPHIL # BLD: 0.1 K/MCL (ref 0–0.5)
EOSINOPHIL NFR BLD: 2 %
ERYTHROCYTE [DISTWIDTH] IN BLOOD: 12.5 % (ref 11–15)
ERYTHROCYTE [DISTWIDTH] IN BLOOD: 12.5 % (ref 11–15)
FASTING DURATION TIME PATIENT: ABNORMAL H
GFR SERPLBLD BASED ON 1.73 SQ M-ARVRAT: >90 ML/MIN
GLOBULIN SER-MCNC: 3.4 G/DL (ref 2–4)
GLUCOSE BLDC GLUCOMTR-MCNC: 119 MG/DL (ref 70–99)
GLUCOSE BLDC GLUCOMTR-MCNC: 128 MG/DL (ref 70–99)
GLUCOSE SERPL-MCNC: 129 MG/DL (ref 70–99)
HCT VFR BLD CALC: 36.3 % (ref 39–51)
HCT VFR BLD CALC: 37.6 % (ref 39–51)
HGB BLD-MCNC: 11.7 G/DL (ref 13–17)
HGB BLD-MCNC: 12 G/DL (ref 13–17)
IMM GRANULOCYTES # BLD AUTO: 0 K/MCL (ref 0–0.2)
IMM GRANULOCYTES # BLD: 1 %
INR PPP: 1.1
LIPASE SERPL-CCNC: 134 UNITS/L (ref 73–393)
LYMPHOCYTES # BLD: 1 K/MCL (ref 1–4)
LYMPHOCYTES NFR BLD: 12 %
MAGNESIUM SERPL-MCNC: 2.1 MG/DL (ref 1.7–2.4)
MCH RBC QN AUTO: 30.8 PG (ref 26–34)
MCH RBC QN AUTO: 31 PG (ref 26–34)
MCHC RBC AUTO-ENTMCNC: 31.9 G/DL (ref 32–36.5)
MCHC RBC AUTO-ENTMCNC: 32.2 G/DL (ref 32–36.5)
MCV RBC AUTO: 96 FL (ref 78–100)
MCV RBC AUTO: 96.7 FL (ref 78–100)
MONOCYTES # BLD: 0.8 K/MCL (ref 0.3–0.9)
MONOCYTES NFR BLD: 9 %
NEUTROPHILS # BLD: 6.3 K/MCL (ref 1.8–7.7)
NEUTROPHILS NFR BLD: 75 %
NRBC BLD MANUAL-RTO: 0 /100 WBC
NRBC BLD MANUAL-RTO: 0 /100 WBC
NT-PROBNP SERPL-MCNC: 50 PG/ML
OPIATES UR QL SCN>300 NG/ML: POSITIVE
P AXIS (DEGREES): 38
PCP UR QL SCN>25 NG/ML: NEGATIVE
PLATELET # BLD AUTO: 249 K/MCL (ref 140–450)
PLATELET # BLD AUTO: 263 K/MCL (ref 140–450)
POTASSIUM SERPL-SCNC: 4.2 MMOL/L (ref 3.4–5.1)
PR-INTERVAL (MSEC): 136
PROT SERPL-MCNC: 7.3 G/DL (ref 6.4–8.2)
PROTHROMBIN TIME: 11.2 SEC (ref 9.7–11.8)
QRS-INTERVAL (MSEC): 96
QT-INTERVAL (MSEC): 382
QTC: 429
R AXIS (DEGREES): -12
RBC # BLD: 3.78 MIL/MCL (ref 4.5–5.9)
RBC # BLD: 3.89 MIL/MCL (ref 4.5–5.9)
REPORT TEXT: NORMAL
SODIUM SERPL-SCNC: 140 MMOL/L (ref 135–145)
SYSTOLIC BLOOD PRESSURE: 197
T AXIS (DEGREES): 60
TROPONIN I SERPL DL<=0.01 NG/ML-MCNC: 10 NG/L
TROPONIN I SERPL DL<=0.01 NG/ML-MCNC: 11 NG/L
TROPONIN I SERPL DL<=0.01 NG/ML-MCNC: 11 NG/L
VENTRICULAR RATE EKG/MIN (BPM): 76
WBC # BLD: 8.3 K/MCL (ref 4.2–11)
WBC # BLD: 8.4 K/MCL (ref 4.2–11)

## 2023-04-05 PROCEDURE — 82962 GLUCOSE BLOOD TEST: CPT

## 2023-04-05 PROCEDURE — 84484 ASSAY OF TROPONIN QUANT: CPT

## 2023-04-05 PROCEDURE — 93005 ELECTROCARDIOGRAM TRACING: CPT | Performed by: EMERGENCY MEDICINE

## 2023-04-05 PROCEDURE — 96361 HYDRATE IV INFUSION ADD-ON: CPT

## 2023-04-05 PROCEDURE — 83735 ASSAY OF MAGNESIUM: CPT

## 2023-04-05 PROCEDURE — 10004651 HB RX, NO CHARGE ITEM: Performed by: HOSPITALIST

## 2023-04-05 PROCEDURE — 85610 PROTHROMBIN TIME: CPT | Performed by: HOSPITALIST

## 2023-04-05 PROCEDURE — 80307 DRUG TEST PRSMV CHEM ANLYZR: CPT | Performed by: HOSPITALIST

## 2023-04-05 PROCEDURE — 84484 ASSAY OF TROPONIN QUANT: CPT | Performed by: HOSPITALIST

## 2023-04-05 PROCEDURE — 85027 COMPLETE CBC AUTOMATED: CPT | Performed by: HOSPITALIST

## 2023-04-05 PROCEDURE — 10002803 HB RX 637

## 2023-04-05 PROCEDURE — 10002800 HB RX 250 W HCPCS

## 2023-04-05 PROCEDURE — 99222 1ST HOSP IP/OBS MODERATE 55: CPT | Performed by: HOSPITALIST

## 2023-04-05 PROCEDURE — 10002800 HB RX 250 W HCPCS: Performed by: HOSPITALIST

## 2023-04-05 PROCEDURE — 85730 THROMBOPLASTIN TIME PARTIAL: CPT | Performed by: HOSPITALIST

## 2023-04-05 PROCEDURE — G0378 HOSPITAL OBSERVATION PER HR: HCPCS

## 2023-04-05 PROCEDURE — 83880 ASSAY OF NATRIURETIC PEPTIDE: CPT

## 2023-04-05 PROCEDURE — 85025 COMPLETE CBC W/AUTO DIFF WBC: CPT

## 2023-04-05 PROCEDURE — 96376 TX/PRO/DX INJ SAME DRUG ADON: CPT

## 2023-04-05 PROCEDURE — 83690 ASSAY OF LIPASE: CPT

## 2023-04-05 PROCEDURE — 96374 THER/PROPH/DIAG INJ IV PUSH: CPT

## 2023-04-05 PROCEDURE — 10002807 HB RX 258

## 2023-04-05 PROCEDURE — 36415 COLL VENOUS BLD VENIPUNCTURE: CPT

## 2023-04-05 PROCEDURE — 10004157 XR CHEST AP OR PA

## 2023-04-05 PROCEDURE — 36415 COLL VENOUS BLD VENIPUNCTURE: CPT | Performed by: HOSPITALIST

## 2023-04-05 PROCEDURE — 80053 COMPREHEN METABOLIC PANEL: CPT

## 2023-04-05 PROCEDURE — 96375 TX/PRO/DX INJ NEW DRUG ADDON: CPT

## 2023-04-05 PROCEDURE — 99285 EMERGENCY DEPT VISIT HI MDM: CPT

## 2023-04-05 RX ORDER — HEPARIN SODIUM 10000 [USP'U]/100ML
1-30 INJECTION, SOLUTION INTRAVENOUS CONTINUOUS
Status: DISCONTINUED | OUTPATIENT
Start: 2023-04-05 | End: 2023-04-07

## 2023-04-05 RX ORDER — HYDROXYZINE HYDROCHLORIDE 25 MG/1
25 TABLET, FILM COATED ORAL DAILY PRN
COMMUNITY

## 2023-04-05 RX ORDER — ACETAMINOPHEN 325 MG/1
650 TABLET ORAL EVERY 4 HOURS PRN
Status: DISCONTINUED | OUTPATIENT
Start: 2023-04-05 | End: 2023-04-11 | Stop reason: HOSPADM

## 2023-04-05 RX ORDER — HEPARIN SODIUM 1000 [USP'U]/ML
4000 INJECTION, SOLUTION INTRAVENOUS; SUBCUTANEOUS PRN
Status: DISCONTINUED | OUTPATIENT
Start: 2023-04-05 | End: 2023-04-07

## 2023-04-05 RX ORDER — LISINOPRIL 20 MG/1
20 TABLET ORAL DAILY
Status: ON HOLD | COMMUNITY
End: 2023-04-10 | Stop reason: HOSPADM

## 2023-04-05 RX ORDER — NICOTINE POLACRILEX 4 MG
30 LOZENGE BUCCAL PRN
Status: DISCONTINUED | OUTPATIENT
Start: 2023-04-05 | End: 2023-04-11 | Stop reason: HOSPADM

## 2023-04-05 RX ORDER — OMEPRAZOLE 40 MG/1
40 CAPSULE, DELAYED RELEASE ORAL DAILY
COMMUNITY

## 2023-04-05 RX ORDER — NICOTINE POLACRILEX 4 MG
15 LOZENGE BUCCAL PRN
Status: DISCONTINUED | OUTPATIENT
Start: 2023-04-05 | End: 2023-04-11 | Stop reason: HOSPADM

## 2023-04-05 RX ORDER — PANTOPRAZOLE SODIUM 40 MG/1
40 TABLET, DELAYED RELEASE ORAL
Status: DISCONTINUED | OUTPATIENT
Start: 2023-04-06 | End: 2023-04-11 | Stop reason: HOSPADM

## 2023-04-05 RX ORDER — NITROGLYCERIN 0.4 MG/1
0.4 TABLET SUBLINGUAL EVERY 5 MIN PRN
Status: DISCONTINUED | OUTPATIENT
Start: 2023-04-05 | End: 2023-04-10

## 2023-04-05 RX ORDER — METOPROLOL SUCCINATE 25 MG/1
25 TABLET, EXTENDED RELEASE ORAL DAILY
Status: DISCONTINUED | OUTPATIENT
Start: 2023-04-06 | End: 2023-04-11 | Stop reason: HOSPADM

## 2023-04-05 RX ORDER — HEPARIN SODIUM 1000 [USP'U]/ML
4000 INJECTION, SOLUTION INTRAVENOUS; SUBCUTANEOUS ONCE
Status: COMPLETED | OUTPATIENT
Start: 2023-04-05 | End: 2023-04-06

## 2023-04-05 RX ORDER — BISACODYL 10 MG
10 SUPPOSITORY, RECTAL RECTAL DAILY PRN
Status: DISCONTINUED | OUTPATIENT
Start: 2023-04-05 | End: 2023-04-11 | Stop reason: HOSPADM

## 2023-04-05 RX ORDER — DEXTROSE MONOHYDRATE 25 G/50ML
12.5 INJECTION, SOLUTION INTRAVENOUS PRN
Status: DISCONTINUED | OUTPATIENT
Start: 2023-04-05 | End: 2023-04-11 | Stop reason: HOSPADM

## 2023-04-05 RX ORDER — ATORVASTATIN CALCIUM 40 MG/1
40 TABLET, FILM COATED ORAL DAILY
Status: DISCONTINUED | OUTPATIENT
Start: 2023-04-06 | End: 2023-04-11 | Stop reason: HOSPADM

## 2023-04-05 RX ORDER — B1/B2/B3/B5/B6/IRON/METH/CHOLN 2.5-18/15
15 LIQUID (ML) ORAL DAILY
COMMUNITY

## 2023-04-05 RX ORDER — ATORVASTATIN CALCIUM 40 MG/1
40 TABLET, FILM COATED ORAL DAILY
COMMUNITY

## 2023-04-05 RX ORDER — AMOXICILLIN 250 MG
2 CAPSULE ORAL 2 TIMES DAILY PRN
Status: DISCONTINUED | OUTPATIENT
Start: 2023-04-05 | End: 2023-04-11 | Stop reason: HOSPADM

## 2023-04-05 RX ORDER — MULTIVIT WITH MINERALS/LUTEIN
1 TABLET ORAL DAILY
COMMUNITY

## 2023-04-05 RX ORDER — ONDANSETRON 2 MG/ML
4 INJECTION INTRAMUSCULAR; INTRAVENOUS EVERY 12 HOURS PRN
Status: DISCONTINUED | OUTPATIENT
Start: 2023-04-05 | End: 2023-04-11 | Stop reason: HOSPADM

## 2023-04-05 RX ORDER — HEPARIN SODIUM 1000 [USP'U]/ML
2000 INJECTION, SOLUTION INTRAVENOUS; SUBCUTANEOUS PRN
Status: DISCONTINUED | OUTPATIENT
Start: 2023-04-05 | End: 2023-04-07

## 2023-04-05 RX ORDER — METOPROLOL SUCCINATE 25 MG/1
25 TABLET, EXTENDED RELEASE ORAL DAILY
COMMUNITY

## 2023-04-05 RX ORDER — HYDROXYZINE HYDROCHLORIDE 25 MG/1
25 TABLET, FILM COATED ORAL DAILY PRN
Status: DISCONTINUED | OUTPATIENT
Start: 2023-04-05 | End: 2023-04-11 | Stop reason: HOSPADM

## 2023-04-05 RX ORDER — SEMAGLUTIDE 1.34 MG/ML
0.5 INJECTION, SOLUTION SUBCUTANEOUS
COMMUNITY

## 2023-04-05 RX ORDER — CLOPIDOGREL BISULFATE 75 MG/1
75 TABLET ORAL DAILY
Status: DISCONTINUED | OUTPATIENT
Start: 2023-04-06 | End: 2023-04-11 | Stop reason: HOSPADM

## 2023-04-05 RX ORDER — FUROSEMIDE 20 MG/1
20 TABLET ORAL 2 TIMES DAILY
COMMUNITY

## 2023-04-05 RX ORDER — DEXTROSE MONOHYDRATE 25 G/50ML
25 INJECTION, SOLUTION INTRAVENOUS PRN
Status: DISCONTINUED | OUTPATIENT
Start: 2023-04-05 | End: 2023-04-11 | Stop reason: HOSPADM

## 2023-04-05 RX ORDER — CLOPIDOGREL BISULFATE 75 MG/1
75 TABLET ORAL DAILY
COMMUNITY

## 2023-04-05 RX ORDER — ASPIRIN 325 MG
325 TABLET, DELAYED RELEASE (ENTERIC COATED) ORAL DAILY
Status: ON HOLD | COMMUNITY
End: 2023-04-10 | Stop reason: HOSPADM

## 2023-04-05 RX ORDER — EMPAGLIFLOZIN, METFORMIN HYDROCHLORIDE 12.5; 1 MG/1; MG/1
1 TABLET, EXTENDED RELEASE ORAL 2 TIMES DAILY
COMMUNITY

## 2023-04-05 RX ORDER — 0.9 % SODIUM CHLORIDE 0.9 %
2 VIAL (ML) INJECTION EVERY 12 HOURS SCHEDULED
Status: DISCONTINUED | OUTPATIENT
Start: 2023-04-05 | End: 2023-04-11 | Stop reason: HOSPADM

## 2023-04-05 RX ORDER — ACETAMINOPHEN 650 MG/1
650 SUPPOSITORY RECTAL EVERY 4 HOURS PRN
Status: DISCONTINUED | OUTPATIENT
Start: 2023-04-05 | End: 2023-04-11 | Stop reason: HOSPADM

## 2023-04-05 RX ORDER — POLYETHYLENE GLYCOL 3350 17 G/17G
17 POWDER, FOR SOLUTION ORAL DAILY PRN
Status: DISCONTINUED | OUTPATIENT
Start: 2023-04-05 | End: 2023-04-11 | Stop reason: HOSPADM

## 2023-04-05 RX ORDER — HYDROCODONE BITARTRATE AND ACETAMINOPHEN 5; 325 MG/1; MG/1
1 TABLET ORAL EVERY 4 HOURS PRN
Status: DISCONTINUED | OUTPATIENT
Start: 2023-04-05 | End: 2023-04-07

## 2023-04-05 RX ORDER — CLOPIDOGREL BISULFATE 75 MG/1
75 TABLET ORAL DAILY
Status: DISCONTINUED | OUTPATIENT
Start: 2023-04-06 | End: 2023-04-05

## 2023-04-05 RX ORDER — LISINOPRIL 20 MG/1
20 TABLET ORAL DAILY
Status: DISCONTINUED | OUTPATIENT
Start: 2023-04-06 | End: 2023-04-08

## 2023-04-05 RX ORDER — ASPIRIN 81 MG/1
81 TABLET, CHEWABLE ORAL DAILY
Status: DISCONTINUED | OUTPATIENT
Start: 2023-04-06 | End: 2023-04-11 | Stop reason: HOSPADM

## 2023-04-05 RX ORDER — ONDANSETRON 2 MG/ML
4 INJECTION INTRAMUSCULAR; INTRAVENOUS ONCE
Status: COMPLETED | OUTPATIENT
Start: 2023-04-05 | End: 2023-04-05

## 2023-04-05 RX ORDER — PROCHLORPERAZINE EDISYLATE 5 MG/ML
5 INJECTION INTRAMUSCULAR; INTRAVENOUS EVERY 6 HOURS PRN
Status: DISCONTINUED | OUTPATIENT
Start: 2023-04-05 | End: 2023-04-11 | Stop reason: HOSPADM

## 2023-04-05 RX ORDER — NITROGLYCERIN 0.4 MG/1
0.4 TABLET SUBLINGUAL EVERY 5 MIN PRN
Status: ON HOLD | COMMUNITY
End: 2023-04-10 | Stop reason: HOSPADM

## 2023-04-05 RX ADMIN — SODIUM CHLORIDE, PRESERVATIVE FREE 2 ML: 5 INJECTION INTRAVENOUS at 19:51

## 2023-04-05 RX ADMIN — NITROGLYCERIN 1 INCH: 20 OINTMENT TOPICAL at 13:10

## 2023-04-05 RX ADMIN — ONDANSETRON 4 MG: 2 INJECTION INTRAMUSCULAR; INTRAVENOUS at 13:40

## 2023-04-05 RX ADMIN — MORPHINE SULFATE 4 MG: 4 INJECTION, SOLUTION INTRAMUSCULAR; INTRAVENOUS at 13:40

## 2023-04-05 RX ADMIN — MORPHINE SULFATE 2 MG: 4 INJECTION, SOLUTION INTRAMUSCULAR; INTRAVENOUS at 20:10

## 2023-04-05 RX ADMIN — SODIUM CHLORIDE 500 ML: 9 INJECTION, SOLUTION INTRAVENOUS at 13:27

## 2023-04-05 RX ADMIN — MORPHINE SULFATE 2 MG: 4 INJECTION, SOLUTION INTRAMUSCULAR; INTRAVENOUS at 18:07

## 2023-04-05 RX ADMIN — ONDANSETRON 4 MG: 2 INJECTION INTRAMUSCULAR; INTRAVENOUS at 15:28

## 2023-04-05 RX ADMIN — MORPHINE SULFATE 2 MG: 4 INJECTION, SOLUTION INTRAMUSCULAR; INTRAVENOUS at 22:43

## 2023-04-05 RX ADMIN — MORPHINE SULFATE 2 MG: 4 INJECTION, SOLUTION INTRAMUSCULAR; INTRAVENOUS at 15:24

## 2023-04-05 SDOH — ECONOMIC STABILITY: TRANSPORTATION INSECURITY
IN THE PAST 12 MONTHS, HAS LACK OF TRANSPORTATION KEPT YOU FROM MEETINGS, WORK, OR FROM GETTING THINGS NEEDED FOR DAILY LIVING?: NO

## 2023-04-05 SDOH — HEALTH STABILITY: GENERAL: BECAUSE OF A PHYSICAL, MENTAL, OR EMOTIONAL CONDITION, DO YOU HAVE DIFFICULTY DOING ERRANDS ALONE?: NO

## 2023-04-05 SDOH — ECONOMIC STABILITY: TRANSPORTATION INSECURITY
IN THE PAST 12 MONTHS, HAS THE LACK OF TRANSPORTATION KEPT YOU FROM MEDICAL APPOINTMENTS OR FROM GETTING MEDICATIONS?: NO

## 2023-04-05 SDOH — SOCIAL STABILITY: SOCIAL NETWORK
HOW OFTEN DO YOU SEE OR TALK TO PEOPLE THAT YOU CARE ABOUT AND FEEL CLOSE TO? (FOR EXAMPLE: TALKING TO FRIENDS ON THE PHONE, VISITING FRIENDS OR FAMILY, GOING TO CHURCH OR CLUB MEETINGS): 5 OR MORE TIMES A WEEK

## 2023-04-05 SDOH — ECONOMIC STABILITY: HOUSING INSECURITY: ARE YOU WORRIED ABOUT LOSING YOUR HOUSING?: NO

## 2023-04-05 SDOH — ECONOMIC STABILITY: FOOD INSECURITY: HOW OFTEN IN THE PAST 12 MONTHS WERE YOU WORRIED OR STRESSED ABOUT HAVING ENOUGH MONEY TO BUY NUTRITIOUS MEALS?: NEVER

## 2023-04-05 SDOH — ECONOMIC STABILITY: HOUSING INSECURITY: WHAT IS YOUR LIVING SITUATION TODAY?: HOUSE

## 2023-04-05 SDOH — ECONOMIC STABILITY: GENERAL

## 2023-04-05 SDOH — HEALTH STABILITY: PHYSICAL HEALTH: DO YOU HAVE DIFFICULTY DRESSING OR BATHING?: NO

## 2023-04-05 SDOH — ECONOMIC STABILITY: HOUSING INSECURITY: WHAT IS YOUR LIVING SITUATION TODAY?: SPOUSE

## 2023-04-05 SDOH — SOCIAL STABILITY: SOCIAL NETWORK: SUPPORT SYSTEMS: SPOUSE;CHILDREN

## 2023-04-05 SDOH — HEALTH STABILITY: PHYSICAL HEALTH: DO YOU HAVE SERIOUS DIFFICULTY WALKING OR CLIMBING STAIRS?: NO

## 2023-04-05 SDOH — HEALTH STABILITY: GENERAL
BECAUSE OF A PHYSICAL, MENTAL, OR EMOTIONAL CONDITION, DO YOU HAVE SERIOUS DIFFICULTY CONCENTRATING, REMEMBERING OR MAKING DECISIONS?: NO

## 2023-04-05 ASSESSMENT — LIFESTYLE VARIABLES
HOW OFTEN DO YOU HAVE 6 OR MORE DRINKS ON ONE OCCASION: NEVER
HOW OFTEN DO YOU HAVE A DRINK CONTAINING ALCOHOL: NEVER
AUDIT-C TOTAL SCORE: 0
HOW MANY STANDARD DRINKS CONTAINING ALCOHOL DO YOU HAVE ON A TYPICAL DAY: 0,1 OR 2

## 2023-04-05 ASSESSMENT — PAIN SCALES - GENERAL
PAINLEVEL_OUTOF10: 6
PAINLEVEL_OUTOF10: 0
PAINLEVEL_OUTOF10: 4
PAINLEVEL_OUTOF10: 2
PAINLEVEL_OUTOF10: 2
PAINLEVEL_OUTOF10: 7
PAINLEVEL_OUTOF10: 6
PAINLEVEL_OUTOF10: 4
PAINLEVEL_OUTOF10: 6
PAINLEVEL_OUTOF10: 7

## 2023-04-05 ASSESSMENT — PATIENT HEALTH QUESTIONNAIRE - PHQ9
1. LITTLE INTEREST OR PLEASURE IN DOING THINGS: NOT AT ALL
2. FEELING DOWN, DEPRESSED OR HOPELESS: NOT AT ALL
IS PATIENT ABLE TO COMPLETE PHQ2 OR PHQ9: YES
CLINICAL INTERPRETATION OF PHQ2 SCORE: NO FURTHER SCREENING NEEDED
SUM OF ALL RESPONSES TO PHQ9 QUESTIONS 1 AND 2: 0
SUM OF ALL RESPONSES TO PHQ9 QUESTIONS 1 AND 2: 0

## 2023-04-05 ASSESSMENT — ACTIVITIES OF DAILY LIVING (ADL)
ADL_SCORE: 12
ADL_BEFORE_ADMISSION: INDEPENDENT
ADL_SHORT_OF_BREATH: NO
RECENT_DECLINE_ADL: NO

## 2023-04-05 ASSESSMENT — HEART SCORE
AGE: EQUAL OR GREATER THAN 65
RISK FACTORS: EQUAL OR GREATER  THAN 3 RISK FACTORS OR HISTORY OF ATHEROSCLEROTIC DISEASE
HEART SCORE: 6
TROPONIN: EQUAL OR LESS THAN NORMAL LIMIT
EKG: NORMAL
HISTORY: HIGHLY SUSPICIOUS

## 2023-04-05 ASSESSMENT — PAIN DESCRIPTION - PAIN TYPE: TYPE: CHEST PAIN

## 2023-04-06 ENCOUNTER — APPOINTMENT (OUTPATIENT)
Dept: CV DIAGNOSTICS | Age: 68
End: 2023-04-06
Attending: HOSPITALIST

## 2023-04-06 PROBLEM — Z98.61 CAD S/P PERCUTANEOUS CORONARY ANGIOPLASTY: Status: ACTIVE | Noted: 2023-01-10

## 2023-04-06 PROBLEM — I48.0 PAROXYSMAL ATRIAL FIBRILLATION (CMD): Status: ACTIVE | Noted: 2022-08-13

## 2023-04-06 PROBLEM — E27.8 ADRENAL NODULE (CMD): Status: ACTIVE | Noted: 2023-03-31

## 2023-04-06 PROBLEM — R60.9 ASYMMETRIC EDEMA OF BOTH LOWER EXTREMITIES: Status: ACTIVE | Noted: 2023-04-06

## 2023-04-06 PROBLEM — I50.22 CHRONIC HFREF (HEART FAILURE WITH REDUCED EJECTION FRACTION) (CMD): Status: ACTIVE | Noted: 2022-10-04

## 2023-04-06 PROBLEM — E11.9 TYPE 2 DIABETES MELLITUS WITHOUT COMPLICATION, WITHOUT LONG-TERM CURRENT USE OF INSULIN (CMD): Status: ACTIVE | Noted: 2022-10-04

## 2023-04-06 PROBLEM — Z95.5 HISTORY OF CORONARY ANGIOPLASTY WITH INSERTION OF STENT: Status: ACTIVE | Noted: 2023-04-06

## 2023-04-06 PROBLEM — I24.9 ACUTE CORONARY SYNDROME (CMD): Status: ACTIVE | Noted: 2023-01-07

## 2023-04-06 PROBLEM — I25.10 CAD S/P PERCUTANEOUS CORONARY ANGIOPLASTY: Status: ACTIVE | Noted: 2023-01-10

## 2023-04-06 LAB
ALBUMIN SERPL-MCNC: 3.6 G/DL (ref 3.6–5.1)
ALBUMIN/GLOB SERPL: 1.1 {RATIO} (ref 1–2.4)
ALP SERPL-CCNC: 85 UNITS/L (ref 45–117)
ALT SERPL-CCNC: 26 UNITS/L
ANION GAP SERPL CALC-SCNC: 7 MMOL/L (ref 7–19)
APTT PPP: 34 SEC (ref 22–30)
APTT PPP: 40 SEC (ref 22–30)
APTT PPP: 46 SEC (ref 22–30)
AST SERPL-CCNC: 14 UNITS/L
AVI LVOT PEAK GRADIENT (LVOTMG): 3.11 MMHG
BASOPHILS # BLD: 0.1 K/MCL (ref 0–0.3)
BASOPHILS NFR BLD: 1 %
BILIRUB SERPL-MCNC: 0.4 MG/DL (ref 0.2–1)
BUN SERPL-MCNC: 24 MG/DL (ref 6–20)
BUN/CREAT SERPL: 32 (ref 7–25)
CALCIUM SERPL-MCNC: 8.7 MG/DL (ref 8.4–10.2)
CHLORIDE SERPL-SCNC: 109 MMOL/L (ref 97–110)
CHOLEST SERPL-MCNC: 73 MG/DL
CHOLEST/HDLC SERPL: 2.2 {RATIO}
CO2 SERPL-SCNC: 25 MMOL/L (ref 21–32)
CREAT SERPL-MCNC: 0.75 MG/DL (ref 0.67–1.17)
DEPRECATED RDW RBC: 44.3 FL (ref 39–50)
DOP CALC LVOT PEAK VEL (LVOTPV): 1.1 M/S
E WAVE DECELARATION TIME (MDT): 0.17 S
EOSINOPHIL # BLD: 0.3 K/MCL (ref 0–0.5)
EOSINOPHIL NFR BLD: 4 %
ERYTHROCYTE [DISTWIDTH] IN BLOOD: 12.4 % (ref 11–15)
EST RIGHT VENT SYSTOLIC PRESSURE BY TRICUSPID REGURGITATION JET (RVSP): 41.45 MMHG
FASTING DURATION TIME PATIENT: ABNORMAL H
FASTING DURATION TIME PATIENT: ABNORMAL H
GFR SERPLBLD BASED ON 1.73 SQ M-ARVRAT: >90 ML/MIN
GLOBULIN SER-MCNC: 3.3 G/DL (ref 2–4)
GLUCOSE BLDC GLUCOMTR-MCNC: 122 MG/DL (ref 70–99)
GLUCOSE BLDC GLUCOMTR-MCNC: 158 MG/DL (ref 70–99)
GLUCOSE BLDC GLUCOMTR-MCNC: 182 MG/DL (ref 70–99)
GLUCOSE BLDC GLUCOMTR-MCNC: 225 MG/DL (ref 70–99)
GLUCOSE SERPL-MCNC: 113 MG/DL (ref 70–99)
HBA1C MFR BLD: 4.9 % (ref 4.5–5.6)
HCT VFR BLD CALC: 35.1 % (ref 39–51)
HDLC SERPL-MCNC: 33 MG/DL
HGB BLD-MCNC: 11.2 G/DL (ref 13–17)
IMM GRANULOCYTES # BLD AUTO: 0.1 K/MCL (ref 0–0.2)
IMM GRANULOCYTES # BLD: 1 %
INTERVENTRICULAR SEPTUM IN END DIASTOLE (IVSD): 1.05 CM
LDLC SERPL CALC-MCNC: 11 MG/DL
LEFT INTERNAL DIMENSION IN SYSTOLE (LVSD): 3.04 CM
LEFT VENTRICULAR INTERNAL DIMENSION IN DIASTOLE (LVDD): 4.61 CM
LEFT VENTRICULAR POSTERIOR WALL IN END DIASTOLE (LVPW): 1.12 CM
LV EF: 68 %
LVOT VTI (LVOTVTI): 29.53 CM
LYMPHOCYTES # BLD: 1.6 K/MCL (ref 1–4)
LYMPHOCYTES NFR BLD: 25 %
MAGNESIUM SERPL-MCNC: 2.3 MG/DL (ref 1.7–2.4)
MCH RBC QN AUTO: 30.9 PG (ref 26–34)
MCHC RBC AUTO-ENTMCNC: 31.9 G/DL (ref 32–36.5)
MCV RBC AUTO: 97 FL (ref 78–100)
MONOCYTES # BLD: 0.7 K/MCL (ref 0.3–0.9)
MONOCYTES NFR BLD: 11 %
MV E TISSUE VEL LAT (MELV): 0.1 M/S
MV E TISSUE VEL MED (MESV): 0.1 M/S
MV E WAVE VEL/E TISSUE VEL MED(MSR): 9.56 UNITLESS
MV PEAK A VELOCITY (MVPAV): 0.8 M/S
MV PEAK E VELOCITY (MVPEV): 1 M/S
NEUTROPHILS # BLD: 3.7 K/MCL (ref 1.8–7.7)
NEUTROPHILS NFR BLD: 58 %
NONHDLC SERPL-MCNC: 40 MG/DL
NRBC BLD MANUAL-RTO: 0 /100 WBC
PHOSPHATE SERPL-MCNC: 3.7 MG/DL (ref 2.4–4.7)
PLATELET # BLD AUTO: 228 K/MCL (ref 140–450)
POTASSIUM SERPL-SCNC: 4.4 MMOL/L (ref 3.4–5.1)
PROT SERPL-MCNC: 6.9 G/DL (ref 6.4–8.2)
RBC # BLD: 3.62 MIL/MCL (ref 4.5–5.9)
SODIUM SERPL-SCNC: 137 MMOL/L (ref 135–145)
TRICUSPID VALVE PEAK REGURGITATION VELOCITY (TRPV): 3.1 M/S
TRIGL SERPL-MCNC: 145 MG/DL
TROPONIN I SERPL DL<=0.01 NG/ML-MCNC: 12 NG/L
TSH SERPL-ACNC: 1.34 MCUNITS/ML (ref 0.35–5)
TV ESTIMATED RIGHT ARTERIAL PRESSURE (RAP): 3 MMHG
WBC # BLD: 6.3 K/MCL (ref 4.2–11)

## 2023-04-06 PROCEDURE — 96366 THER/PROPH/DIAG IV INF ADDON: CPT

## 2023-04-06 PROCEDURE — 99233 SBSQ HOSP IP/OBS HIGH 50: CPT | Performed by: NURSE PRACTITIONER

## 2023-04-06 PROCEDURE — 96376 TX/PRO/DX INJ SAME DRUG ADON: CPT

## 2023-04-06 PROCEDURE — 80053 COMPREHEN METABOLIC PANEL: CPT | Performed by: HOSPITALIST

## 2023-04-06 PROCEDURE — 10002803 HB RX 637: Performed by: HOSPITALIST

## 2023-04-06 PROCEDURE — 93308 TTE F-UP OR LMTD: CPT | Performed by: INTERNAL MEDICINE

## 2023-04-06 PROCEDURE — 80061 LIPID PANEL: CPT | Performed by: HOSPITALIST

## 2023-04-06 PROCEDURE — 36415 COLL VENOUS BLD VENIPUNCTURE: CPT | Performed by: HOSPITALIST

## 2023-04-06 PROCEDURE — G0378 HOSPITAL OBSERVATION PER HR: HCPCS

## 2023-04-06 PROCEDURE — 84100 ASSAY OF PHOSPHORUS: CPT | Performed by: HOSPITALIST

## 2023-04-06 PROCEDURE — 93308 TTE F-UP OR LMTD: CPT

## 2023-04-06 PROCEDURE — 93325 DOPPLER ECHO COLOR FLOW MAPG: CPT | Performed by: INTERNAL MEDICINE

## 2023-04-06 PROCEDURE — 82962 GLUCOSE BLOOD TEST: CPT

## 2023-04-06 PROCEDURE — 85025 COMPLETE CBC W/AUTO DIFF WBC: CPT | Performed by: HOSPITALIST

## 2023-04-06 PROCEDURE — 96365 THER/PROPH/DIAG IV INF INIT: CPT

## 2023-04-06 PROCEDURE — 93321 DOPPLER ECHO F-UP/LMTD STD: CPT | Performed by: INTERNAL MEDICINE

## 2023-04-06 PROCEDURE — 83735 ASSAY OF MAGNESIUM: CPT | Performed by: HOSPITALIST

## 2023-04-06 PROCEDURE — 85730 THROMBOPLASTIN TIME PARTIAL: CPT | Performed by: HOSPITALIST

## 2023-04-06 PROCEDURE — 10004651 HB RX, NO CHARGE ITEM: Performed by: HOSPITALIST

## 2023-04-06 PROCEDURE — 84484 ASSAY OF TROPONIN QUANT: CPT | Performed by: INTERNAL MEDICINE

## 2023-04-06 PROCEDURE — S0310 HOSPITALIST VISIT: HCPCS | Performed by: HOSPITALIST

## 2023-04-06 PROCEDURE — 10002800 HB RX 250 W HCPCS: Performed by: HOSPITALIST

## 2023-04-06 PROCEDURE — 99204 OFFICE O/P NEW MOD 45 MIN: CPT | Performed by: INTERNAL MEDICINE

## 2023-04-06 PROCEDURE — 83036 HEMOGLOBIN GLYCOSYLATED A1C: CPT | Performed by: HOSPITALIST

## 2023-04-06 PROCEDURE — 84443 ASSAY THYROID STIM HORMONE: CPT | Performed by: HOSPITALIST

## 2023-04-06 RX ORDER — 0.9 % SODIUM CHLORIDE 0.9 %
10 VIAL (ML) INJECTION EVERY 12 HOURS SCHEDULED
Status: DISCONTINUED | OUTPATIENT
Start: 2023-04-07 | End: 2023-04-11 | Stop reason: HOSPADM

## 2023-04-06 RX ORDER — 0.9 % SODIUM CHLORIDE 0.9 %
10 VIAL (ML) INJECTION PRN
Status: DISCONTINUED | OUTPATIENT
Start: 2023-04-06 | End: 2023-04-11 | Stop reason: HOSPADM

## 2023-04-06 RX ADMIN — MORPHINE SULFATE 2 MG: 4 INJECTION, SOLUTION INTRAMUSCULAR; INTRAVENOUS at 14:31

## 2023-04-06 RX ADMIN — MORPHINE SULFATE 2 MG: 4 INJECTION, SOLUTION INTRAMUSCULAR; INTRAVENOUS at 21:57

## 2023-04-06 RX ADMIN — SODIUM CHLORIDE, PRESERVATIVE FREE 2 ML: 5 INJECTION INTRAVENOUS at 19:52

## 2023-04-06 RX ADMIN — PANTOPRAZOLE SODIUM 40 MG: 40 TABLET, DELAYED RELEASE ORAL at 05:38

## 2023-04-06 RX ADMIN — CLOPIDOGREL BISULFATE 75 MG: 75 TABLET, FILM COATED ORAL at 09:43

## 2023-04-06 RX ADMIN — HEPARIN SODIUM AND DEXTROSE 12 UNITS/KG/HR: 10000; 5 INJECTION INTRAVENOUS at 09:46

## 2023-04-06 RX ADMIN — ASPIRIN 81 MG CHEWABLE TABLET 81 MG: 81 TABLET CHEWABLE at 09:43

## 2023-04-06 RX ADMIN — MORPHINE SULFATE 2 MG: 4 INJECTION, SOLUTION INTRAMUSCULAR; INTRAVENOUS at 05:38

## 2023-04-06 RX ADMIN — LISINOPRIL 20 MG: 20 TABLET ORAL at 09:43

## 2023-04-06 RX ADMIN — HEPARIN SODIUM 2000 UNITS: 1000 INJECTION, SOLUTION INTRAVENOUS; SUBCUTANEOUS at 23:06

## 2023-04-06 RX ADMIN — SENNOSIDES AND DOCUSATE SODIUM 2 TABLET: 50; 8.6 TABLET ORAL at 23:24

## 2023-04-06 RX ADMIN — NITROGLYCERIN 1 INCH: 20 OINTMENT TOPICAL at 17:19

## 2023-04-06 RX ADMIN — MORPHINE SULFATE 2 MG: 4 INJECTION, SOLUTION INTRAMUSCULAR; INTRAVENOUS at 17:27

## 2023-04-06 RX ADMIN — HEPARIN SODIUM AND DEXTROSE 12 UNITS/KG/HR: 10000; 5 INJECTION INTRAVENOUS at 00:11

## 2023-04-06 RX ADMIN — MORPHINE SULFATE 2 MG: 4 INJECTION, SOLUTION INTRAMUSCULAR; INTRAVENOUS at 09:22

## 2023-04-06 RX ADMIN — HEPARIN SODIUM AND DEXTROSE 14 UNITS/KG/HR: 10000; 5 INJECTION INTRAVENOUS at 23:08

## 2023-04-06 RX ADMIN — MORPHINE SULFATE 2 MG: 4 INJECTION, SOLUTION INTRAMUSCULAR; INTRAVENOUS at 03:08

## 2023-04-06 RX ADMIN — ATORVASTATIN CALCIUM 40 MG: 40 TABLET, FILM COATED ORAL at 09:43

## 2023-04-06 RX ADMIN — MORPHINE SULFATE 2 MG: 4 INJECTION, SOLUTION INTRAMUSCULAR; INTRAVENOUS at 11:38

## 2023-04-06 RX ADMIN — NITROGLYCERIN 1 INCH: 20 OINTMENT TOPICAL at 10:58

## 2023-04-06 RX ADMIN — MORPHINE SULFATE 2 MG: 4 INJECTION, SOLUTION INTRAMUSCULAR; INTRAVENOUS at 00:57

## 2023-04-06 RX ADMIN — MORPHINE SULFATE 2 MG: 4 INJECTION, SOLUTION INTRAMUSCULAR; INTRAVENOUS at 19:48

## 2023-04-06 RX ADMIN — SODIUM CHLORIDE, PRESERVATIVE FREE 2 ML: 5 INJECTION INTRAVENOUS at 09:45

## 2023-04-06 RX ADMIN — HEPARIN SODIUM 4000 UNITS: 1000 INJECTION, SOLUTION INTRAVENOUS; SUBCUTANEOUS at 00:09

## 2023-04-06 ASSESSMENT — PAIN SCALES - GENERAL
PAINLEVEL_OUTOF10: 4
PAINLEVEL_OUTOF10: 6
PAINLEVEL_OUTOF10: 6
PAINLEVEL_OUTOF10: 7
PAINLEVEL_OUTOF10: 8
PAINLEVEL_OUTOF10: 7
PAINLEVEL_OUTOF10: 7
PAINLEVEL_OUTOF10: 4
PAINLEVEL_OUTOF10: 2
PAINLEVEL_OUTOF10: 4
PAINLEVEL_OUTOF10: 3
PAINLEVEL_OUTOF10: 3
PAINLEVEL_OUTOF10: 7
PAINLEVEL_OUTOF10: 7
PAINLEVEL_OUTOF10: 8

## 2023-04-07 LAB
ALBUMIN SERPL-MCNC: 3.5 G/DL (ref 3.6–5.1)
ALBUMIN/GLOB SERPL: 1.1 {RATIO} (ref 1–2.4)
ALP SERPL-CCNC: 85 UNITS/L (ref 45–117)
ALT SERPL-CCNC: 24 UNITS/L
ANION GAP SERPL CALC-SCNC: 8 MMOL/L (ref 7–19)
APTT PPP: 59 SEC (ref 22–30)
AST SERPL-CCNC: 13 UNITS/L
BASOPHILS # BLD: 0.1 K/MCL (ref 0–0.3)
BASOPHILS NFR BLD: 1 %
BILIRUB SERPL-MCNC: 0.3 MG/DL (ref 0.2–1)
BUN SERPL-MCNC: 24 MG/DL (ref 6–20)
BUN/CREAT SERPL: 29 (ref 7–25)
CALCIUM SERPL-MCNC: 8.5 MG/DL (ref 8.4–10.2)
CHLORIDE SERPL-SCNC: 107 MMOL/L (ref 97–110)
CO2 SERPL-SCNC: 26 MMOL/L (ref 21–32)
CREAT SERPL-MCNC: 0.84 MG/DL (ref 0.67–1.17)
DEPRECATED RDW RBC: 44.1 FL (ref 39–50)
EOSINOPHIL # BLD: 0.3 K/MCL (ref 0–0.5)
EOSINOPHIL NFR BLD: 4 %
ERYTHROCYTE [DISTWIDTH] IN BLOOD: 12.3 % (ref 11–15)
FASTING DURATION TIME PATIENT: ABNORMAL H
GFR SERPLBLD BASED ON 1.73 SQ M-ARVRAT: >90 ML/MIN
GLOBULIN SER-MCNC: 3.2 G/DL (ref 2–4)
GLUCOSE BLDC GLUCOMTR-MCNC: 136 MG/DL (ref 70–99)
GLUCOSE BLDC GLUCOMTR-MCNC: 138 MG/DL (ref 70–99)
GLUCOSE BLDC GLUCOMTR-MCNC: 165 MG/DL (ref 70–99)
GLUCOSE BLDC GLUCOMTR-MCNC: 174 MG/DL (ref 70–99)
GLUCOSE SERPL-MCNC: 116 MG/DL (ref 70–99)
HCT VFR BLD CALC: 33.9 % (ref 39–51)
HGB BLD-MCNC: 10.9 G/DL (ref 13–17)
IMM GRANULOCYTES # BLD AUTO: 0.1 K/MCL (ref 0–0.2)
IMM GRANULOCYTES # BLD: 1 %
LYMPHOCYTES # BLD: 1.9 K/MCL (ref 1–4)
LYMPHOCYTES NFR BLD: 24 %
MAGNESIUM SERPL-MCNC: 2.2 MG/DL (ref 1.7–2.4)
MCH RBC QN AUTO: 31.2 PG (ref 26–34)
MCHC RBC AUTO-ENTMCNC: 32.2 G/DL (ref 32–36.5)
MCV RBC AUTO: 97.1 FL (ref 78–100)
MONOCYTES # BLD: 0.8 K/MCL (ref 0.3–0.9)
MONOCYTES NFR BLD: 10 %
NEUTROPHILS # BLD: 4.7 K/MCL (ref 1.8–7.7)
NEUTROPHILS NFR BLD: 60 %
NRBC BLD MANUAL-RTO: 0 /100 WBC
PHOSPHATE SERPL-MCNC: 3.4 MG/DL (ref 2.4–4.7)
PLATELET # BLD AUTO: 232 K/MCL (ref 140–450)
POTASSIUM SERPL-SCNC: 4.5 MMOL/L (ref 3.4–5.1)
PROT SERPL-MCNC: 6.7 G/DL (ref 6.4–8.2)
RBC # BLD: 3.49 MIL/MCL (ref 4.5–5.9)
SODIUM SERPL-SCNC: 136 MMOL/L (ref 135–145)
WBC # BLD: 7.9 K/MCL (ref 4.2–11)

## 2023-04-07 PROCEDURE — 99232 SBSQ HOSP IP/OBS MODERATE 35: CPT | Performed by: INTERNAL MEDICINE

## 2023-04-07 PROCEDURE — 84100 ASSAY OF PHOSPHORUS: CPT | Performed by: HOSPITALIST

## 2023-04-07 PROCEDURE — G0378 HOSPITAL OBSERVATION PER HR: HCPCS

## 2023-04-07 PROCEDURE — 85025 COMPLETE CBC W/AUTO DIFF WBC: CPT | Performed by: HOSPITALIST

## 2023-04-07 PROCEDURE — 10004651 HB RX, NO CHARGE ITEM: Performed by: INTERNAL MEDICINE

## 2023-04-07 PROCEDURE — 10002803 HB RX 637: Performed by: HOSPITALIST

## 2023-04-07 PROCEDURE — 96366 THER/PROPH/DIAG IV INF ADDON: CPT

## 2023-04-07 PROCEDURE — 85730 THROMBOPLASTIN TIME PARTIAL: CPT | Performed by: HOSPITALIST

## 2023-04-07 PROCEDURE — 10002800 HB RX 250 W HCPCS: Performed by: HOSPITALIST

## 2023-04-07 PROCEDURE — 83735 ASSAY OF MAGNESIUM: CPT | Performed by: HOSPITALIST

## 2023-04-07 PROCEDURE — 10002803 HB RX 637: Performed by: NURSE PRACTITIONER

## 2023-04-07 PROCEDURE — 96376 TX/PRO/DX INJ SAME DRUG ADON: CPT

## 2023-04-07 PROCEDURE — 10004651 HB RX, NO CHARGE ITEM: Performed by: HOSPITALIST

## 2023-04-07 PROCEDURE — 93005 ELECTROCARDIOGRAM TRACING: CPT | Performed by: INTERNAL MEDICINE

## 2023-04-07 PROCEDURE — 99233 SBSQ HOSP IP/OBS HIGH 50: CPT | Performed by: NURSE PRACTITIONER

## 2023-04-07 PROCEDURE — 10002803 HB RX 637: Performed by: INTERNAL MEDICINE

## 2023-04-07 PROCEDURE — S0310 HOSPITALIST VISIT: HCPCS | Performed by: HOSPITALIST

## 2023-04-07 PROCEDURE — 93010 ELECTROCARDIOGRAM REPORT: CPT | Performed by: INTERNAL MEDICINE

## 2023-04-07 PROCEDURE — 80053 COMPREHEN METABOLIC PANEL: CPT | Performed by: HOSPITALIST

## 2023-04-07 PROCEDURE — 82962 GLUCOSE BLOOD TEST: CPT

## 2023-04-07 RX ORDER — RANOLAZINE 500 MG/1
500 TABLET, EXTENDED RELEASE ORAL 2 TIMES DAILY
Status: DISCONTINUED | OUTPATIENT
Start: 2023-04-07 | End: 2023-04-09

## 2023-04-07 RX ADMIN — CLOPIDOGREL BISULFATE 75 MG: 75 TABLET, FILM COATED ORAL at 09:40

## 2023-04-07 RX ADMIN — MORPHINE SULFATE 2 MG: 4 INJECTION, SOLUTION INTRAMUSCULAR; INTRAVENOUS at 04:37

## 2023-04-07 RX ADMIN — APIXABAN 5 MG: 5 TABLET, FILM COATED ORAL at 20:37

## 2023-04-07 RX ADMIN — MORPHINE SULFATE 2 MG: 4 INJECTION, SOLUTION INTRAMUSCULAR; INTRAVENOUS at 02:11

## 2023-04-07 RX ADMIN — RANOLAZINE 500 MG: 500 TABLET, FILM COATED, EXTENDED RELEASE ORAL at 09:53

## 2023-04-07 RX ADMIN — LISINOPRIL 20 MG: 20 TABLET ORAL at 09:40

## 2023-04-07 RX ADMIN — SENNOSIDES AND DOCUSATE SODIUM 2 TABLET: 50; 8.6 TABLET ORAL at 09:57

## 2023-04-07 RX ADMIN — SODIUM CHLORIDE, PRESERVATIVE FREE 10 ML: 5 INJECTION INTRAVENOUS at 20:38

## 2023-04-07 RX ADMIN — MORPHINE SULFATE 2 MG: 4 INJECTION, SOLUTION INTRAMUSCULAR; INTRAVENOUS at 00:05

## 2023-04-07 RX ADMIN — NITROGLYCERIN 1 INCH: 20 OINTMENT TOPICAL at 08:07

## 2023-04-07 RX ADMIN — METOPROLOL SUCCINATE 25 MG: 25 TABLET, EXTENDED RELEASE ORAL at 09:41

## 2023-04-07 RX ADMIN — ASPIRIN 81 MG CHEWABLE TABLET 81 MG: 81 TABLET CHEWABLE at 09:40

## 2023-04-07 RX ADMIN — HEPARIN SODIUM AND DEXTROSE 14 UNITS/KG/HR: 10000; 5 INJECTION INTRAVENOUS at 05:25

## 2023-04-07 RX ADMIN — SODIUM CHLORIDE, PRESERVATIVE FREE 2 ML: 5 INJECTION INTRAVENOUS at 08:06

## 2023-04-07 RX ADMIN — RANOLAZINE 500 MG: 500 TABLET, FILM COATED, EXTENDED RELEASE ORAL at 20:37

## 2023-04-07 RX ADMIN — ATORVASTATIN CALCIUM 40 MG: 40 TABLET, FILM COATED ORAL at 09:40

## 2023-04-07 RX ADMIN — APIXABAN 5 MG: 5 TABLET, FILM COATED ORAL at 11:29

## 2023-04-07 RX ADMIN — PANTOPRAZOLE SODIUM 40 MG: 40 TABLET, DELAYED RELEASE ORAL at 09:53

## 2023-04-07 RX ADMIN — MORPHINE SULFATE 2 MG: 4 INJECTION, SOLUTION INTRAMUSCULAR; INTRAVENOUS at 08:05

## 2023-04-07 ASSESSMENT — PAIN SCALES - GENERAL
PAINLEVEL_OUTOF10: 0
PAINLEVEL_OUTOF10: 2
PAINLEVEL_OUTOF10: 6
PAINLEVEL_OUTOF10: 8
PAINLEVEL_OUTOF10: 6
PAINLEVEL_OUTOF10: 0
PAINLEVEL_OUTOF10: 0
PAINLEVEL_OUTOF10: 7

## 2023-04-08 LAB
ALBUMIN SERPL-MCNC: 3.5 G/DL (ref 3.6–5.1)
ALBUMIN/GLOB SERPL: 1.1 {RATIO} (ref 1–2.4)
ALP SERPL-CCNC: 90 UNITS/L (ref 45–117)
ALT SERPL-CCNC: 23 UNITS/L
ANION GAP SERPL CALC-SCNC: 6 MMOL/L (ref 7–19)
APTT PPP: 31 SEC (ref 22–30)
AST SERPL-CCNC: 16 UNITS/L
BASOPHILS # BLD: 0 K/MCL (ref 0–0.3)
BASOPHILS NFR BLD: 1 %
BILIRUB SERPL-MCNC: 0.3 MG/DL (ref 0.2–1)
BUN SERPL-MCNC: 21 MG/DL (ref 6–20)
BUN/CREAT SERPL: 28 (ref 7–25)
CALCIUM SERPL-MCNC: 8.8 MG/DL (ref 8.4–10.2)
CHLORIDE SERPL-SCNC: 109 MMOL/L (ref 97–110)
CO2 SERPL-SCNC: 28 MMOL/L (ref 21–32)
CREAT SERPL-MCNC: 0.76 MG/DL (ref 0.67–1.17)
DEPRECATED RDW RBC: 43.6 FL (ref 39–50)
EOSINOPHIL # BLD: 0.2 K/MCL (ref 0–0.5)
EOSINOPHIL NFR BLD: 3 %
ERYTHROCYTE [DISTWIDTH] IN BLOOD: 12.3 % (ref 11–15)
FASTING DURATION TIME PATIENT: ABNORMAL H
GFR SERPLBLD BASED ON 1.73 SQ M-ARVRAT: >90 ML/MIN
GLOBULIN SER-MCNC: 3.2 G/DL (ref 2–4)
GLUCOSE BLDC GLUCOMTR-MCNC: 115 MG/DL (ref 70–99)
GLUCOSE BLDC GLUCOMTR-MCNC: 152 MG/DL (ref 70–99)
GLUCOSE BLDC GLUCOMTR-MCNC: 182 MG/DL (ref 70–99)
GLUCOSE SERPL-MCNC: 134 MG/DL (ref 70–99)
HCT VFR BLD CALC: 35.6 % (ref 39–51)
HGB BLD-MCNC: 11.4 G/DL (ref 13–17)
IMM GRANULOCYTES # BLD AUTO: 0 K/MCL (ref 0–0.2)
IMM GRANULOCYTES # BLD: 0 %
LYMPHOCYTES # BLD: 1.4 K/MCL (ref 1–4)
LYMPHOCYTES NFR BLD: 20 %
MAGNESIUM SERPL-MCNC: 2.4 MG/DL (ref 1.7–2.4)
MCH RBC QN AUTO: 31.1 PG (ref 26–34)
MCHC RBC AUTO-ENTMCNC: 32 G/DL (ref 32–36.5)
MCV RBC AUTO: 97 FL (ref 78–100)
MONOCYTES # BLD: 0.7 K/MCL (ref 0.3–0.9)
MONOCYTES NFR BLD: 9 %
NEUTROPHILS # BLD: 4.8 K/MCL (ref 1.8–7.7)
NEUTROPHILS NFR BLD: 67 %
NRBC BLD MANUAL-RTO: 0 /100 WBC
PHOSPHATE SERPL-MCNC: 2.8 MG/DL (ref 2.4–4.7)
PLATELET # BLD AUTO: 258 K/MCL (ref 140–450)
POTASSIUM SERPL-SCNC: 4.6 MMOL/L (ref 3.4–5.1)
PROT SERPL-MCNC: 6.7 G/DL (ref 6.4–8.2)
RBC # BLD: 3.67 MIL/MCL (ref 4.5–5.9)
SODIUM SERPL-SCNC: 138 MMOL/L (ref 135–145)
WBC # BLD: 7.1 K/MCL (ref 4.2–11)

## 2023-04-08 PROCEDURE — G0378 HOSPITAL OBSERVATION PER HR: HCPCS

## 2023-04-08 PROCEDURE — 85025 COMPLETE CBC W/AUTO DIFF WBC: CPT | Performed by: HOSPITALIST

## 2023-04-08 PROCEDURE — 99233 SBSQ HOSP IP/OBS HIGH 50: CPT | Performed by: INTERNAL MEDICINE

## 2023-04-08 PROCEDURE — 80053 COMPREHEN METABOLIC PANEL: CPT | Performed by: HOSPITALIST

## 2023-04-08 PROCEDURE — 10004651 HB RX, NO CHARGE ITEM: Performed by: INTERNAL MEDICINE

## 2023-04-08 PROCEDURE — 10004651 HB RX, NO CHARGE ITEM: Performed by: HOSPITALIST

## 2023-04-08 PROCEDURE — 93005 ELECTROCARDIOGRAM TRACING: CPT | Performed by: HOSPITALIST

## 2023-04-08 PROCEDURE — 82962 GLUCOSE BLOOD TEST: CPT

## 2023-04-08 PROCEDURE — 84100 ASSAY OF PHOSPHORUS: CPT | Performed by: HOSPITALIST

## 2023-04-08 PROCEDURE — 10002803 HB RX 637: Performed by: HOSPITALIST

## 2023-04-08 PROCEDURE — 85730 THROMBOPLASTIN TIME PARTIAL: CPT | Performed by: HOSPITALIST

## 2023-04-08 PROCEDURE — 10002803 HB RX 637: Performed by: INTERNAL MEDICINE

## 2023-04-08 PROCEDURE — 93010 ELECTROCARDIOGRAM REPORT: CPT | Performed by: INTERNAL MEDICINE

## 2023-04-08 PROCEDURE — 83735 ASSAY OF MAGNESIUM: CPT | Performed by: HOSPITALIST

## 2023-04-08 PROCEDURE — 10002803 HB RX 637: Performed by: NURSE PRACTITIONER

## 2023-04-08 RX ORDER — LISINOPRIL 10 MG/1
10 TABLET ORAL DAILY
Status: DISCONTINUED | OUTPATIENT
Start: 2023-04-08 | End: 2023-04-11 | Stop reason: HOSPADM

## 2023-04-08 RX ADMIN — RANOLAZINE 500 MG: 500 TABLET, FILM COATED, EXTENDED RELEASE ORAL at 21:19

## 2023-04-08 RX ADMIN — ATORVASTATIN CALCIUM 40 MG: 40 TABLET, FILM COATED ORAL at 09:22

## 2023-04-08 RX ADMIN — SODIUM CHLORIDE, PRESERVATIVE FREE 10 ML: 5 INJECTION INTRAVENOUS at 21:19

## 2023-04-08 RX ADMIN — LISINOPRIL 10 MG: 10 TABLET ORAL at 09:23

## 2023-04-08 RX ADMIN — PANTOPRAZOLE SODIUM 40 MG: 40 TABLET, DELAYED RELEASE ORAL at 09:23

## 2023-04-08 RX ADMIN — METOPROLOL SUCCINATE 25 MG: 25 TABLET, EXTENDED RELEASE ORAL at 09:23

## 2023-04-08 RX ADMIN — ASPIRIN 81 MG CHEWABLE TABLET 81 MG: 81 TABLET CHEWABLE at 09:23

## 2023-04-08 RX ADMIN — RANOLAZINE 500 MG: 500 TABLET, FILM COATED, EXTENDED RELEASE ORAL at 09:23

## 2023-04-08 RX ADMIN — CLOPIDOGREL BISULFATE 75 MG: 75 TABLET, FILM COATED ORAL at 09:22

## 2023-04-08 RX ADMIN — APIXABAN 5 MG: 5 TABLET, FILM COATED ORAL at 09:22

## 2023-04-08 RX ADMIN — APIXABAN 5 MG: 5 TABLET, FILM COATED ORAL at 21:19

## 2023-04-08 ASSESSMENT — PAIN SCALES - GENERAL
PAINLEVEL_OUTOF10: 6
PAINLEVEL_OUTOF10: 6
PAINLEVEL_OUTOF10: 3
PAINLEVEL_OUTOF10: 6
PAINLEVEL_OUTOF10: 6
PAINLEVEL_OUTOF10: 0

## 2023-04-09 LAB
ALBUMIN SERPL-MCNC: 3.5 G/DL (ref 3.6–5.1)
ALBUMIN/GLOB SERPL: 1.1 {RATIO} (ref 1–2.4)
ALP SERPL-CCNC: 84 UNITS/L (ref 45–117)
ALT SERPL-CCNC: 21 UNITS/L
ANION GAP SERPL CALC-SCNC: 6 MMOL/L (ref 7–19)
APTT PPP: 31 SEC (ref 22–30)
AST SERPL-CCNC: 12 UNITS/L
ATRIAL RATE (BPM): 52
BASOPHILS # BLD: 0 K/MCL (ref 0–0.3)
BASOPHILS NFR BLD: 0 %
BILIRUB SERPL-MCNC: 0.5 MG/DL (ref 0.2–1)
BUN SERPL-MCNC: 26 MG/DL (ref 6–20)
BUN/CREAT SERPL: 33 (ref 7–25)
CALCIUM SERPL-MCNC: 8.8 MG/DL (ref 8.4–10.2)
CHLORIDE SERPL-SCNC: 108 MMOL/L (ref 97–110)
CO2 SERPL-SCNC: 28 MMOL/L (ref 21–32)
CREAT SERPL-MCNC: 0.78 MG/DL (ref 0.67–1.17)
DEPRECATED RDW RBC: 44.5 FL (ref 39–50)
EOSINOPHIL # BLD: 0.3 K/MCL (ref 0–0.5)
EOSINOPHIL NFR BLD: 4 %
ERYTHROCYTE [DISTWIDTH] IN BLOOD: 12.3 % (ref 11–15)
FASTING DURATION TIME PATIENT: ABNORMAL H
GFR SERPLBLD BASED ON 1.73 SQ M-ARVRAT: >90 ML/MIN
GLOBULIN SER-MCNC: 3.2 G/DL (ref 2–4)
GLUCOSE BLDC GLUCOMTR-MCNC: 140 MG/DL (ref 70–99)
GLUCOSE BLDC GLUCOMTR-MCNC: 146 MG/DL (ref 70–99)
GLUCOSE BLDC GLUCOMTR-MCNC: 182 MG/DL (ref 70–99)
GLUCOSE BLDC GLUCOMTR-MCNC: 192 MG/DL (ref 70–99)
GLUCOSE SERPL-MCNC: 130 MG/DL (ref 70–99)
HCT VFR BLD CALC: 36.2 % (ref 39–51)
HGB BLD-MCNC: 11.4 G/DL (ref 13–17)
IMM GRANULOCYTES # BLD AUTO: 0.1 K/MCL (ref 0–0.2)
IMM GRANULOCYTES # BLD: 1 %
LYMPHOCYTES # BLD: 1.8 K/MCL (ref 1–4)
LYMPHOCYTES NFR BLD: 23 %
MAGNESIUM SERPL-MCNC: 2.2 MG/DL (ref 1.7–2.4)
MCH RBC QN AUTO: 31.1 PG (ref 26–34)
MCHC RBC AUTO-ENTMCNC: 31.5 G/DL (ref 32–36.5)
MCV RBC AUTO: 98.6 FL (ref 78–100)
MONOCYTES # BLD: 0.8 K/MCL (ref 0.3–0.9)
MONOCYTES NFR BLD: 10 %
NEUTROPHILS # BLD: 5 K/MCL (ref 1.8–7.7)
NEUTROPHILS NFR BLD: 62 %
NRBC BLD MANUAL-RTO: 0 /100 WBC
P AXIS (DEGREES): 59
PHOSPHATE SERPL-MCNC: 3.4 MG/DL (ref 2.4–4.7)
PLATELET # BLD AUTO: 254 K/MCL (ref 140–450)
POTASSIUM SERPL-SCNC: 4.4 MMOL/L (ref 3.4–5.1)
PR-INTERVAL (MSEC): 150
PROT SERPL-MCNC: 6.7 G/DL (ref 6.4–8.2)
QRS-INTERVAL (MSEC): 106
QT-INTERVAL (MSEC): 410
QTC: 381
R AXIS (DEGREES): -34
RBC # BLD: 3.67 MIL/MCL (ref 4.5–5.9)
REPORT TEXT: NORMAL
SODIUM SERPL-SCNC: 138 MMOL/L (ref 135–145)
T AXIS (DEGREES): 10
VENTRICULAR RATE EKG/MIN (BPM): 52
WBC # BLD: 8 K/MCL (ref 4.2–11)

## 2023-04-09 PROCEDURE — G0378 HOSPITAL OBSERVATION PER HR: HCPCS

## 2023-04-09 PROCEDURE — 82962 GLUCOSE BLOOD TEST: CPT

## 2023-04-09 PROCEDURE — 85730 THROMBOPLASTIN TIME PARTIAL: CPT | Performed by: HOSPITALIST

## 2023-04-09 PROCEDURE — 85025 COMPLETE CBC W/AUTO DIFF WBC: CPT | Performed by: HOSPITALIST

## 2023-04-09 PROCEDURE — 84100 ASSAY OF PHOSPHORUS: CPT | Performed by: HOSPITALIST

## 2023-04-09 PROCEDURE — 99232 SBSQ HOSP IP/OBS MODERATE 35: CPT | Performed by: INTERNAL MEDICINE

## 2023-04-09 PROCEDURE — 10002803 HB RX 637: Performed by: NURSE PRACTITIONER

## 2023-04-09 PROCEDURE — 10004651 HB RX, NO CHARGE ITEM: Performed by: HOSPITALIST

## 2023-04-09 PROCEDURE — 83735 ASSAY OF MAGNESIUM: CPT | Performed by: HOSPITALIST

## 2023-04-09 PROCEDURE — 10004651 HB RX, NO CHARGE ITEM: Performed by: INTERNAL MEDICINE

## 2023-04-09 PROCEDURE — 10002803 HB RX 637: Performed by: INTERNAL MEDICINE

## 2023-04-09 PROCEDURE — 80053 COMPREHEN METABOLIC PANEL: CPT | Performed by: HOSPITALIST

## 2023-04-09 PROCEDURE — 10002803 HB RX 637: Performed by: HOSPITALIST

## 2023-04-09 RX ORDER — ISOSORBIDE MONONITRATE 30 MG/1
30 TABLET, EXTENDED RELEASE ORAL DAILY
Status: DISCONTINUED | OUTPATIENT
Start: 2023-04-09 | End: 2023-04-10

## 2023-04-09 RX ADMIN — APIXABAN 5 MG: 5 TABLET, FILM COATED ORAL at 13:12

## 2023-04-09 RX ADMIN — CLOPIDOGREL BISULFATE 75 MG: 75 TABLET, FILM COATED ORAL at 08:26

## 2023-04-09 RX ADMIN — PANTOPRAZOLE SODIUM 40 MG: 40 TABLET, DELAYED RELEASE ORAL at 09:50

## 2023-04-09 RX ADMIN — METOPROLOL SUCCINATE 25 MG: 25 TABLET, EXTENDED RELEASE ORAL at 09:50

## 2023-04-09 RX ADMIN — APIXABAN 5 MG: 5 TABLET, FILM COATED ORAL at 20:18

## 2023-04-09 RX ADMIN — ISOSORBIDE MONONITRATE 30 MG: 30 TABLET, EXTENDED RELEASE ORAL at 09:50

## 2023-04-09 RX ADMIN — SODIUM CHLORIDE, PRESERVATIVE FREE 10 ML: 5 INJECTION INTRAVENOUS at 20:18

## 2023-04-09 RX ADMIN — ASPIRIN 81 MG CHEWABLE TABLET 81 MG: 81 TABLET CHEWABLE at 08:26

## 2023-04-09 RX ADMIN — ATORVASTATIN CALCIUM 40 MG: 40 TABLET, FILM COATED ORAL at 08:26

## 2023-04-09 RX ADMIN — LISINOPRIL 10 MG: 10 TABLET ORAL at 09:50

## 2023-04-09 ASSESSMENT — PAIN SCALES - GENERAL
PAINLEVEL_OUTOF10: 0

## 2023-04-10 LAB
ALBUMIN SERPL-MCNC: 3.4 G/DL (ref 3.6–5.1)
ALBUMIN/GLOB SERPL: 1 {RATIO} (ref 1–2.4)
ALP SERPL-CCNC: 78 UNITS/L (ref 45–117)
ALT SERPL-CCNC: 24 UNITS/L
ANION GAP SERPL CALC-SCNC: 10 MMOL/L (ref 7–19)
APTT PPP: 29 SEC (ref 22–30)
AST SERPL-CCNC: 15 UNITS/L
BASOPHILS # BLD: 0 K/MCL (ref 0–0.3)
BASOPHILS NFR BLD: 0 %
BILIRUB SERPL-MCNC: 0.7 MG/DL (ref 0.2–1)
BUN SERPL-MCNC: 31 MG/DL (ref 6–20)
BUN/CREAT SERPL: 40 (ref 7–25)
CALCIUM SERPL-MCNC: 8.5 MG/DL (ref 8.4–10.2)
CHLORIDE SERPL-SCNC: 109 MMOL/L (ref 97–110)
CO2 SERPL-SCNC: 24 MMOL/L (ref 21–32)
CREAT SERPL-MCNC: 0.77 MG/DL (ref 0.67–1.17)
DEPRECATED RDW RBC: 44.5 FL (ref 39–50)
EOSINOPHIL # BLD: 0.3 K/MCL (ref 0–0.5)
EOSINOPHIL NFR BLD: 3 %
ERYTHROCYTE [DISTWIDTH] IN BLOOD: 12.4 % (ref 11–15)
FASTING DURATION TIME PATIENT: ABNORMAL H
GFR SERPLBLD BASED ON 1.73 SQ M-ARVRAT: >90 ML/MIN
GLOBULIN SER-MCNC: 3.3 G/DL (ref 2–4)
GLUCOSE BLDC GLUCOMTR-MCNC: 128 MG/DL (ref 70–99)
GLUCOSE BLDC GLUCOMTR-MCNC: 157 MG/DL (ref 70–99)
GLUCOSE BLDC GLUCOMTR-MCNC: 193 MG/DL (ref 70–99)
GLUCOSE BLDC GLUCOMTR-MCNC: 211 MG/DL (ref 70–99)
GLUCOSE SERPL-MCNC: 205 MG/DL (ref 70–99)
HCT VFR BLD CALC: 37 % (ref 39–51)
HGB BLD-MCNC: 11.9 G/DL (ref 13–17)
IMM GRANULOCYTES # BLD AUTO: 0.1 K/MCL (ref 0–0.2)
IMM GRANULOCYTES # BLD: 1 %
LYMPHOCYTES # BLD: 0.4 K/MCL (ref 1–4)
LYMPHOCYTES NFR BLD: 4 %
MAGNESIUM SERPL-MCNC: 2 MG/DL (ref 1.7–2.4)
MCH RBC QN AUTO: 31.2 PG (ref 26–34)
MCHC RBC AUTO-ENTMCNC: 32.2 G/DL (ref 32–36.5)
MCV RBC AUTO: 96.9 FL (ref 78–100)
MONOCYTES # BLD: 1.3 K/MCL (ref 0.3–0.9)
MONOCYTES NFR BLD: 11 %
NEUTROPHILS # BLD: 9.4 K/MCL (ref 1.8–7.7)
NEUTROPHILS NFR BLD: 81 %
NRBC BLD MANUAL-RTO: 0 /100 WBC
PHOSPHATE SERPL-MCNC: 3.1 MG/DL (ref 2.4–4.7)
PLATELET # BLD AUTO: 231 K/MCL (ref 140–450)
POTASSIUM SERPL-SCNC: 4.6 MMOL/L (ref 3.4–5.1)
PROT SERPL-MCNC: 6.7 G/DL (ref 6.4–8.2)
RBC # BLD: 3.82 MIL/MCL (ref 4.5–5.9)
SODIUM SERPL-SCNC: 138 MMOL/L (ref 135–145)
WBC # BLD: 11.5 K/MCL (ref 4.2–11)

## 2023-04-10 PROCEDURE — 10002803 HB RX 637: Performed by: NURSE PRACTITIONER

## 2023-04-10 PROCEDURE — 80053 COMPREHEN METABOLIC PANEL: CPT | Performed by: HOSPITALIST

## 2023-04-10 PROCEDURE — 10002803 HB RX 637: Performed by: INTERNAL MEDICINE

## 2023-04-10 PROCEDURE — 85025 COMPLETE CBC W/AUTO DIFF WBC: CPT | Performed by: HOSPITALIST

## 2023-04-10 PROCEDURE — 10002800 HB RX 250 W HCPCS: Performed by: HOSPITALIST

## 2023-04-10 PROCEDURE — 99232 SBSQ HOSP IP/OBS MODERATE 35: CPT | Performed by: HOSPITALIST

## 2023-04-10 PROCEDURE — 10002803 HB RX 637: Performed by: HOSPITALIST

## 2023-04-10 PROCEDURE — 10004651 HB RX, NO CHARGE ITEM: Performed by: HOSPITALIST

## 2023-04-10 PROCEDURE — 84100 ASSAY OF PHOSPHORUS: CPT | Performed by: HOSPITALIST

## 2023-04-10 PROCEDURE — 96372 THER/PROPH/DIAG INJ SC/IM: CPT | Performed by: HOSPITALIST

## 2023-04-10 PROCEDURE — 96376 TX/PRO/DX INJ SAME DRUG ADON: CPT

## 2023-04-10 PROCEDURE — 82962 GLUCOSE BLOOD TEST: CPT

## 2023-04-10 PROCEDURE — 85730 THROMBOPLASTIN TIME PARTIAL: CPT | Performed by: HOSPITALIST

## 2023-04-10 PROCEDURE — G0378 HOSPITAL OBSERVATION PER HR: HCPCS

## 2023-04-10 PROCEDURE — 83735 ASSAY OF MAGNESIUM: CPT | Performed by: HOSPITALIST

## 2023-04-10 PROCEDURE — 10004651 HB RX, NO CHARGE ITEM: Performed by: INTERNAL MEDICINE

## 2023-04-10 RX ORDER — LISINOPRIL 10 MG/1
10 TABLET ORAL DAILY
Qty: 30 TABLET | Refills: 0 | Status: SHIPPED | OUTPATIENT
Start: 2023-04-11 | End: 2024-04-10

## 2023-04-10 RX ORDER — ASPIRIN 81 MG/1
81 TABLET, CHEWABLE ORAL DAILY
Qty: 30 TABLET | Refills: 0 | Status: SHIPPED | OUTPATIENT
Start: 2023-04-11

## 2023-04-10 RX ADMIN — APIXABAN 5 MG: 5 TABLET, FILM COATED ORAL at 08:03

## 2023-04-10 RX ADMIN — NITROGLYCERIN 1 INCH: 20 OINTMENT TOPICAL at 17:27

## 2023-04-10 RX ADMIN — ONDANSETRON 4 MG: 2 INJECTION INTRAMUSCULAR; INTRAVENOUS at 16:58

## 2023-04-10 RX ADMIN — METOPROLOL SUCCINATE 25 MG: 25 TABLET, EXTENDED RELEASE ORAL at 08:03

## 2023-04-10 RX ADMIN — INSULIN LISPRO 1 UNITS: 100 INJECTION, SOLUTION INTRAVENOUS; SUBCUTANEOUS at 13:04

## 2023-04-10 RX ADMIN — PANTOPRAZOLE SODIUM 40 MG: 40 TABLET, DELAYED RELEASE ORAL at 08:04

## 2023-04-10 RX ADMIN — SODIUM CHLORIDE, PRESERVATIVE FREE 10 ML: 5 INJECTION INTRAVENOUS at 20:57

## 2023-04-10 RX ADMIN — ASPIRIN 81 MG CHEWABLE TABLET 81 MG: 81 TABLET CHEWABLE at 08:03

## 2023-04-10 RX ADMIN — ONDANSETRON 4 MG: 2 INJECTION INTRAMUSCULAR; INTRAVENOUS at 02:58

## 2023-04-10 RX ADMIN — SODIUM CHLORIDE, PRESERVATIVE FREE 2 ML: 5 INJECTION INTRAVENOUS at 08:04

## 2023-04-10 RX ADMIN — INSULIN LISPRO 2 UNITS: 100 INJECTION, SOLUTION INTRAVENOUS; SUBCUTANEOUS at 08:03

## 2023-04-10 RX ADMIN — SODIUM CHLORIDE, PRESERVATIVE FREE 10 ML: 5 INJECTION INTRAVENOUS at 08:04

## 2023-04-10 RX ADMIN — ISOSORBIDE MONONITRATE 30 MG: 30 TABLET, EXTENDED RELEASE ORAL at 08:03

## 2023-04-10 RX ADMIN — APIXABAN 5 MG: 5 TABLET, FILM COATED ORAL at 20:56

## 2023-04-10 RX ADMIN — LISINOPRIL 10 MG: 10 TABLET ORAL at 08:03

## 2023-04-10 RX ADMIN — SODIUM CHLORIDE, PRESERVATIVE FREE 2 ML: 5 INJECTION INTRAVENOUS at 20:57

## 2023-04-10 RX ADMIN — CLOPIDOGREL BISULFATE 75 MG: 75 TABLET, FILM COATED ORAL at 08:03

## 2023-04-10 RX ADMIN — ATORVASTATIN CALCIUM 40 MG: 40 TABLET, FILM COATED ORAL at 08:03

## 2023-04-10 ASSESSMENT — PAIN SCALES - GENERAL
PAINLEVEL_OUTOF10: 0
PAINLEVEL_OUTOF10: 6
PAINLEVEL_OUTOF10: 6
PAINLEVEL_OUTOF10: 0
PAINLEVEL_OUTOF10: 0

## 2023-04-11 VITALS
HEIGHT: 66 IN | WEIGHT: 190.92 LBS | TEMPERATURE: 97.9 F | DIASTOLIC BLOOD PRESSURE: 78 MMHG | SYSTOLIC BLOOD PRESSURE: 154 MMHG | BODY MASS INDEX: 30.68 KG/M2 | RESPIRATION RATE: 18 BRPM | OXYGEN SATURATION: 98 % | HEART RATE: 63 BPM

## 2023-04-11 LAB — GLUCOSE BLDC GLUCOMTR-MCNC: 153 MG/DL (ref 70–99)

## 2023-04-11 PROCEDURE — 82962 GLUCOSE BLOOD TEST: CPT

## 2023-04-11 PROCEDURE — 10002803 HB RX 637: Performed by: INTERNAL MEDICINE

## 2023-04-11 PROCEDURE — 99239 HOSP IP/OBS DSCHRG MGMT >30: CPT | Performed by: HOSPITALIST

## 2023-04-11 PROCEDURE — 10002803 HB RX 637: Performed by: NURSE PRACTITIONER

## 2023-04-11 PROCEDURE — G0378 HOSPITAL OBSERVATION PER HR: HCPCS

## 2023-04-11 PROCEDURE — 10002803 HB RX 637: Performed by: HOSPITALIST

## 2023-04-11 PROCEDURE — 10004651 HB RX, NO CHARGE ITEM: Performed by: HOSPITALIST

## 2023-04-11 RX ADMIN — ASPIRIN 81 MG CHEWABLE TABLET 81 MG: 81 TABLET CHEWABLE at 08:56

## 2023-04-11 RX ADMIN — METOPROLOL SUCCINATE 25 MG: 25 TABLET, EXTENDED RELEASE ORAL at 08:56

## 2023-04-11 RX ADMIN — PANTOPRAZOLE SODIUM 40 MG: 40 TABLET, DELAYED RELEASE ORAL at 05:34

## 2023-04-11 RX ADMIN — LISINOPRIL 10 MG: 10 TABLET ORAL at 08:56

## 2023-04-11 RX ADMIN — ATORVASTATIN CALCIUM 40 MG: 40 TABLET, FILM COATED ORAL at 08:56

## 2023-04-11 RX ADMIN — CLOPIDOGREL BISULFATE 75 MG: 75 TABLET, FILM COATED ORAL at 08:56

## 2023-04-11 RX ADMIN — APIXABAN 5 MG: 5 TABLET, FILM COATED ORAL at 08:56

## 2023-04-11 ASSESSMENT — PAIN SCALES - GENERAL
PAINLEVEL_OUTOF10: 0
PAINLEVEL_OUTOF10: 0

## 2023-04-14 LAB
ATRIAL RATE (BPM): 49
P AXIS (DEGREES): 46
PR-INTERVAL (MSEC): 150
QRS-INTERVAL (MSEC): 98
QT-INTERVAL (MSEC): 436
QTC: 394
R AXIS (DEGREES): -2
REPORT TEXT: NORMAL
T AXIS (DEGREES): -7
VENTRICULAR RATE EKG/MIN (BPM): 49

## 2024-12-18 NOTE — PROGRESS NOTES
Patient upset and reports to this RN that he is not going to go home because he has no ride to [place] and we have not done his stress test. Updated Dr. Giron.   24